# Patient Record
Sex: FEMALE | Race: WHITE | Employment: PART TIME | URBAN - NONMETROPOLITAN AREA
[De-identification: names, ages, dates, MRNs, and addresses within clinical notes are randomized per-mention and may not be internally consistent; named-entity substitution may affect disease eponyms.]

---

## 2017-02-23 ENCOUNTER — OFFICE VISIT (OUTPATIENT)
Dept: PSYCHIATRY | Age: 42
End: 2017-02-23

## 2017-02-23 DIAGNOSIS — F06.4 ANXIETY DISORDER DUE TO GENERAL MEDICAL CONDITION: ICD-10-CM

## 2017-02-23 DIAGNOSIS — F06.30 MOOD DISORDER DUE TO A GENERAL MEDICAL CONDITION: Primary | ICD-10-CM

## 2017-02-23 PROCEDURE — 99214 OFFICE O/P EST MOD 30 MIN: CPT | Performed by: NURSE PRACTITIONER

## 2017-02-23 PROCEDURE — G8428 CUR MEDS NOT DOCUMENT: HCPCS | Performed by: NURSE PRACTITIONER

## 2017-02-23 PROCEDURE — G8419 CALC BMI OUT NRM PARAM NOF/U: HCPCS | Performed by: NURSE PRACTITIONER

## 2017-02-23 PROCEDURE — G8484 FLU IMMUNIZE NO ADMIN: HCPCS | Performed by: NURSE PRACTITIONER

## 2017-02-23 PROCEDURE — 1036F TOBACCO NON-USER: CPT | Performed by: NURSE PRACTITIONER

## 2017-02-23 RX ORDER — RAMELTEON 8 MG/1
8 TABLET ORAL NIGHTLY PRN
Qty: 30 TABLET | Refills: 0 | Status: SHIPPED | OUTPATIENT
Start: 2017-02-23 | End: 2017-05-01 | Stop reason: ALTCHOICE

## 2017-02-23 RX ORDER — DULOXETIN HYDROCHLORIDE 30 MG/1
60 CAPSULE, DELAYED RELEASE ORAL 2 TIMES DAILY
COMMUNITY
End: 2022-01-31

## 2017-03-07 ENCOUNTER — TELEPHONE (OUTPATIENT)
Dept: PSYCHIATRY | Age: 42
End: 2017-03-07

## 2017-05-01 ENCOUNTER — OFFICE VISIT (OUTPATIENT)
Dept: ONCOLOGY | Age: 42
End: 2017-05-01

## 2017-05-01 VITALS
DIASTOLIC BLOOD PRESSURE: 80 MMHG | HEART RATE: 66 BPM | SYSTOLIC BLOOD PRESSURE: 132 MMHG | TEMPERATURE: 97.4 F | HEIGHT: 64 IN | BODY MASS INDEX: 28.48 KG/M2 | WEIGHT: 166.8 LBS | RESPIRATION RATE: 18 BRPM | OXYGEN SATURATION: 100 %

## 2017-05-01 DIAGNOSIS — Z85.3 HISTORY OF BREAST CANCER: Primary | ICD-10-CM

## 2017-05-01 PROCEDURE — 1036F TOBACCO NON-USER: CPT | Performed by: INTERNAL MEDICINE

## 2017-05-01 PROCEDURE — 99213 OFFICE O/P EST LOW 20 MIN: CPT | Performed by: INTERNAL MEDICINE

## 2017-05-01 PROCEDURE — G8419 CALC BMI OUT NRM PARAM NOF/U: HCPCS | Performed by: INTERNAL MEDICINE

## 2017-05-01 PROCEDURE — G8427 DOCREV CUR MEDS BY ELIG CLIN: HCPCS | Performed by: INTERNAL MEDICINE

## 2017-08-31 ENCOUNTER — HOSPITAL ENCOUNTER (OUTPATIENT)
Dept: MRI IMAGING | Age: 42
Discharge: HOME | End: 2017-08-31
Payer: COMMERCIAL

## 2017-08-31 DIAGNOSIS — G43.001 MIGRAINE WITHOUT AURA AND WITH STATUS MIGRAINOSUS, NOT INTRACTABLE: ICD-10-CM

## 2017-08-31 PROCEDURE — 70553 MRI BRAIN STEM W/O & W/DYE: CPT

## 2017-08-31 PROCEDURE — A9579 GAD-BASE MR CONTRAST NOS,1ML: HCPCS | Performed by: FAMILY MEDICINE

## 2017-08-31 PROCEDURE — 6360000004 HC RX CONTRAST MEDICATION: Performed by: FAMILY MEDICINE

## 2017-08-31 RX ADMIN — GADOTERIDOL 15 ML: 279.3 INJECTION, SOLUTION INTRAVENOUS at 09:01

## 2017-09-11 ENCOUNTER — OFFICE VISIT (OUTPATIENT)
Dept: ONCOLOGY | Age: 42
End: 2017-09-11
Payer: COMMERCIAL

## 2017-09-11 ENCOUNTER — HOSPITAL ENCOUNTER (OUTPATIENT)
Dept: INFUSION THERAPY | Age: 42
Discharge: HOME | End: 2017-09-11
Payer: COMMERCIAL

## 2017-09-11 VITALS
DIASTOLIC BLOOD PRESSURE: 71 MMHG | BODY MASS INDEX: 28.17 KG/M2 | OXYGEN SATURATION: 100 % | SYSTOLIC BLOOD PRESSURE: 98 MMHG | WEIGHT: 165 LBS | TEMPERATURE: 98.4 F | HEART RATE: 89 BPM | RESPIRATION RATE: 18 BRPM | HEIGHT: 64 IN

## 2017-09-11 DIAGNOSIS — Z85.3 HISTORY OF BREAST CANCER: Primary | ICD-10-CM

## 2017-09-11 DIAGNOSIS — Z85.3 HISTORY OF BREAST CANCER: ICD-10-CM

## 2017-09-11 LAB
ALBUMIN SERPL-MCNC: 4.7 G/DL (ref 3.5–5.1)
ALP BLD-CCNC: 80 U/L (ref 38–126)
ALT SERPL-CCNC: 13 U/L (ref 11–66)
AST SERPL-CCNC: 16 U/L (ref 5–40)
BASINOPHIL, AUTOMATED: 1 % (ref 0–12)
BILIRUB SERPL-MCNC: 0.2 MG/DL (ref 0.3–1.2)
BILIRUBIN DIRECT: < 0.2 MG/DL (ref 0–0.3)
BUN, WHOLE BLOOD: 21 MG/DL (ref 8–26)
CHLORIDE, WHOLE BLOOD: 106 MEQ/L (ref 98–109)
CREATININE, WHOLE BLOOD: 0.8 MG/DL (ref 0.5–1.2)
EOSINOPHILS RELATIVE PERCENT: 3 % (ref 0–12)
GFR, ESTIMATED ,CON: 83 ML/MIN/1.73M2
GLUCOSE, WHOLE BLOOD: 107 MG/DL (ref 70–108)
HCT VFR BLD CALC: 44.6 % (ref 37–47)
HEMOGLOBIN: 15.2 GM/DL (ref 12–16)
IONIZED CALCIUM, WHOLE BLOOD: 1.25 MMOL/L (ref 1.12–1.32)
LYMPHOCYTES # BLD: 35 % (ref 15–47)
MCH RBC QN AUTO: 29.7 PG (ref 27–31)
MCHC RBC AUTO-ENTMCNC: 34.2 GM/DL (ref 33–37)
MCV RBC AUTO: 87 FL (ref 81–99)
MONOCYTES: 7 % (ref 0–12)
PDW BLD-RTO: 12.7 % (ref 11.5–14.5)
PLATELET # BLD: 287 THOU/MM3 (ref 130–400)
PMV BLD AUTO: 8.1 MCM (ref 7.4–10.4)
POTASSIUM, WHOLE BLOOD: 3.9 MEQ/L (ref 3.5–4.9)
RBC # BLD: 5.13 MILL/MM3 (ref 4.2–5.4)
SEG NEUTROPHILS: 54 % (ref 43–75)
SODIUM, WHOLE BLOOD: 143 MEQ/L (ref 138–146)
TOTAL CO2, WHOLE BLOOD: 25 MEQ/L (ref 23–33)
TOTAL PROTEIN: 7.4 G/DL (ref 6.1–8)
WBC # BLD: 6.5 THOU/MM3 (ref 4.8–10.8)

## 2017-09-11 PROCEDURE — 86300 IMMUNOASSAY TUMOR CA 15-3: CPT

## 2017-09-11 PROCEDURE — G8428 CUR MEDS NOT DOCUMENT: HCPCS | Performed by: INTERNAL MEDICINE

## 2017-09-11 PROCEDURE — 80047 BASIC METABLC PNL IONIZED CA: CPT

## 2017-09-11 PROCEDURE — 80076 HEPATIC FUNCTION PANEL: CPT

## 2017-09-11 PROCEDURE — 99213 OFFICE O/P EST LOW 20 MIN: CPT | Performed by: INTERNAL MEDICINE

## 2017-09-11 PROCEDURE — 36415 COLL VENOUS BLD VENIPUNCTURE: CPT

## 2017-09-11 PROCEDURE — 99211 OFF/OP EST MAY X REQ PHY/QHP: CPT

## 2017-09-11 PROCEDURE — G8417 CALC BMI ABV UP PARAM F/U: HCPCS | Performed by: INTERNAL MEDICINE

## 2017-09-11 PROCEDURE — 1036F TOBACCO NON-USER: CPT | Performed by: INTERNAL MEDICINE

## 2017-09-11 PROCEDURE — 85025 COMPLETE CBC W/AUTO DIFF WBC: CPT

## 2017-09-11 RX ORDER — RIZATRIPTAN BENZOATE 10 MG/1
10 TABLET, ORALLY DISINTEGRATING ORAL
COMMUNITY
Start: 2017-09-08

## 2017-09-11 RX ORDER — TOPIRAMATE 50 MG/1
100 TABLET, FILM COATED ORAL 2 TIMES DAILY
COMMUNITY
Start: 2017-09-05

## 2017-09-13 LAB — CA 27-29: 20 U/ML (ref 0–38)

## 2017-09-19 DIAGNOSIS — Z85.3 HISTORY OF BREAST CANCER: Primary | ICD-10-CM

## 2017-10-24 ENCOUNTER — HOSPITAL ENCOUNTER (OUTPATIENT)
Dept: GENERAL RADIOLOGY | Age: 42
Discharge: HOME | End: 2017-10-24
Payer: COMMERCIAL

## 2017-10-24 ENCOUNTER — HOSPITAL ENCOUNTER (OUTPATIENT)
Age: 42
Discharge: HOME | End: 2017-10-24
Payer: COMMERCIAL

## 2017-10-24 DIAGNOSIS — M54.6 THORACIC SPINE PAIN: ICD-10-CM

## 2017-10-24 PROCEDURE — 72072 X-RAY EXAM THORAC SPINE 3VWS: CPT

## 2017-10-24 PROCEDURE — 72040 X-RAY EXAM NECK SPINE 2-3 VW: CPT

## 2017-12-21 ENCOUNTER — HOSPITAL ENCOUNTER (OUTPATIENT)
Dept: MRI IMAGING | Age: 42
Discharge: HOME | End: 2017-12-21
Payer: COMMERCIAL

## 2017-12-21 DIAGNOSIS — M54.2 NECK PAIN: ICD-10-CM

## 2017-12-21 DIAGNOSIS — Z85.3 HISTORY OF BREAST CANCER: ICD-10-CM

## 2017-12-21 PROCEDURE — C8908 MRI W/O FOL W/CONT, BREAST,: HCPCS

## 2017-12-21 PROCEDURE — A9579 GAD-BASE MR CONTRAST NOS,1ML: HCPCS | Performed by: INTERNAL MEDICINE

## 2017-12-21 PROCEDURE — 72141 MRI NECK SPINE W/O DYE: CPT

## 2017-12-21 PROCEDURE — 6360000004 HC RX CONTRAST MEDICATION: Performed by: INTERNAL MEDICINE

## 2017-12-21 RX ADMIN — GADOTERIDOL 15 ML: 279.3 INJECTION, SOLUTION INTRAVENOUS at 21:03

## 2018-02-12 ENCOUNTER — HOSPITAL ENCOUNTER (OUTPATIENT)
Dept: INFUSION THERAPY | Age: 43
Discharge: HOME | End: 2018-02-12
Payer: COMMERCIAL

## 2018-02-12 ENCOUNTER — OFFICE VISIT (OUTPATIENT)
Dept: ONCOLOGY | Age: 43
End: 2018-02-12
Payer: COMMERCIAL

## 2018-02-12 VITALS
RESPIRATION RATE: 16 BRPM | HEART RATE: 69 BPM | SYSTOLIC BLOOD PRESSURE: 104 MMHG | OXYGEN SATURATION: 100 % | BODY MASS INDEX: 26.63 KG/M2 | DIASTOLIC BLOOD PRESSURE: 73 MMHG | WEIGHT: 156 LBS | HEIGHT: 64 IN | TEMPERATURE: 98.2 F

## 2018-02-12 DIAGNOSIS — Z85.3 HISTORY OF BREAST CANCER: Primary | ICD-10-CM

## 2018-02-12 DIAGNOSIS — Z85.3 HISTORY OF BREAST CANCER: ICD-10-CM

## 2018-02-12 LAB
ALBUMIN SERPL-MCNC: 4.5 G/DL (ref 3.5–5.1)
ALP BLD-CCNC: 56 U/L (ref 38–126)
ALT SERPL-CCNC: 9 U/L (ref 11–66)
AST SERPL-CCNC: 11 U/L (ref 5–40)
BASINOPHIL, AUTOMATED: 0 % (ref 0–12)
BILIRUB SERPL-MCNC: 0.2 MG/DL (ref 0.3–1.2)
BILIRUBIN DIRECT: < 0.2 MG/DL (ref 0–0.3)
BUN, WHOLE BLOOD: 18 MG/DL (ref 8–26)
CHLORIDE, WHOLE BLOOD: 110 MEQ/L (ref 98–109)
CREATININE, WHOLE BLOOD: 1 MG/DL (ref 0.5–1.2)
EOSINOPHILS RELATIVE PERCENT: 1 % (ref 0–12)
GFR, ESTIMATED ,CON: 64 ML/MIN/1.73M2
GLUCOSE, WHOLE BLOOD: 86 MG/DL (ref 70–108)
HCT VFR BLD CALC: 40.2 % (ref 37–47)
HEMOGLOBIN: 13.7 GM/DL (ref 12–16)
IONIZED CALCIUM, WHOLE BLOOD: 1.26 MMOL/L (ref 1.12–1.32)
LYMPHOCYTES # BLD: 40 % (ref 15–47)
MCH RBC QN AUTO: 29.2 PG (ref 27–31)
MCHC RBC AUTO-ENTMCNC: 34 GM/DL (ref 33–37)
MCV RBC AUTO: 86 FL (ref 81–99)
MONOCYTES: 6 % (ref 0–12)
PDW BLD-RTO: 12.1 % (ref 11.5–14.5)
PLATELET # BLD: 267 THOU/MM3 (ref 130–400)
PMV BLD AUTO: 7.9 FL (ref 7.4–10.4)
POTASSIUM, WHOLE BLOOD: 4.1 MEQ/L (ref 3.5–4.9)
RBC # BLD: 4.68 MILL/MM3 (ref 4.2–5.4)
SEG NEUTROPHILS: 53 % (ref 43–75)
SODIUM, WHOLE BLOOD: 140 MEQ/L (ref 138–146)
TOTAL CO2, WHOLE BLOOD: 21 MEQ/L (ref 23–33)
TOTAL PROTEIN: 6.6 G/DL (ref 6.1–8)
WBC # BLD: 7.4 THOU/MM3 (ref 4.8–10.8)

## 2018-02-12 PROCEDURE — 99211 OFF/OP EST MAY X REQ PHY/QHP: CPT

## 2018-02-12 PROCEDURE — 86304 IMMUNOASSAY TUMOR CA 125: CPT

## 2018-02-12 PROCEDURE — 1036F TOBACCO NON-USER: CPT | Performed by: INTERNAL MEDICINE

## 2018-02-12 PROCEDURE — G8484 FLU IMMUNIZE NO ADMIN: HCPCS | Performed by: INTERNAL MEDICINE

## 2018-02-12 PROCEDURE — 85025 COMPLETE CBC W/AUTO DIFF WBC: CPT

## 2018-02-12 PROCEDURE — 80047 BASIC METABLC PNL IONIZED CA: CPT

## 2018-02-12 PROCEDURE — 80076 HEPATIC FUNCTION PANEL: CPT

## 2018-02-12 PROCEDURE — G8427 DOCREV CUR MEDS BY ELIG CLIN: HCPCS | Performed by: INTERNAL MEDICINE

## 2018-02-12 PROCEDURE — 99213 OFFICE O/P EST LOW 20 MIN: CPT | Performed by: INTERNAL MEDICINE

## 2018-02-12 PROCEDURE — G8417 CALC BMI ABV UP PARAM F/U: HCPCS | Performed by: INTERNAL MEDICINE

## 2018-02-12 PROCEDURE — 36415 COLL VENOUS BLD VENIPUNCTURE: CPT

## 2018-02-12 NOTE — PROGRESS NOTES
[facility] CANCER CENTER  CANCER NETWORK OF [facility]  ONCOLOGY SPECIALISTS OF [facility] [address] [facility] 98  [address], [facility] [address]  [address]  Dept: [phone]  Dept Fax: [phone]  Loc: [phone]    Subjective:      Chief Complaint: Blas Merino is a 38 y.o. female with breast cancer. She will has a past history of breast cancer dating back to 2009. In March 2009, the patient was noted to have a right breast cancer. She underwent bilateral mastectomy. The tumor in the right breast measured 1.5 cm and was noted to be an invasive carcinoma. One out of sixteen lymph nodes from the right axilla were positive for malignancy. There was no evidence of malignancy involving the left breast.  Her malignancy was noted to be estrogen receptor positive, progesterone receptor positive, and HER-2/mikala overexpression was not amplified. She received six cycles of TAC combination chemotherapy treatment. These were completed between April and November 2009. She was placed on antiestrogen therapy with tamoxifen and completed a full course of therapy. HPI:  The patient is here today for follow up evaluation. Her general health has been stable. In December 2017 the patient underwent an MRI of her chest.  She was noted to have bilateral mastectomies with bilateral implant reconstruction. There are several circumcised circumscribed enhancing micronodules measuring less than 5 mm within both breast.  A follow-up MRI was recommended to confirm stability in 6 months. No pathologically enlarged lymph nodes. The result was reviewed with the patient today in a follow-up MRI will be obtained in June 2018. The patient generally feels well without specific complaints. She has no signs or symptoms suggestive of recurrence of malignancy. The patient denies shortness of breath, chest pain, a change in bowel habits or a change in bladder habits.   ECOG performance status is

## 2018-02-13 LAB — CA 125: 6 U/ML

## 2018-06-11 ENCOUNTER — HOSPITAL ENCOUNTER (OUTPATIENT)
Dept: INFUSION THERAPY | Age: 43
Discharge: HOME | End: 2018-06-11
Payer: COMMERCIAL

## 2018-06-11 ENCOUNTER — OFFICE VISIT (OUTPATIENT)
Dept: ONCOLOGY | Age: 43
End: 2018-06-11
Payer: COMMERCIAL

## 2018-06-11 VITALS
DIASTOLIC BLOOD PRESSURE: 71 MMHG | SYSTOLIC BLOOD PRESSURE: 102 MMHG | TEMPERATURE: 98.2 F | WEIGHT: 146.4 LBS | BODY MASS INDEX: 25 KG/M2 | HEIGHT: 64 IN | OXYGEN SATURATION: 98 % | HEART RATE: 67 BPM | RESPIRATION RATE: 16 BRPM

## 2018-06-11 DIAGNOSIS — Z85.3 HISTORY OF BREAST CANCER: Primary | ICD-10-CM

## 2018-06-11 DIAGNOSIS — Z85.3 HISTORY OF BREAST CANCER: ICD-10-CM

## 2018-06-11 LAB
ALBUMIN SERPL-MCNC: 4.2 G/DL (ref 3.5–5.1)
ALP BLD-CCNC: 58 U/L (ref 38–126)
ALT SERPL-CCNC: 8 U/L (ref 11–66)
AST SERPL-CCNC: 12 U/L (ref 5–40)
BASINOPHIL, AUTOMATED: 0 % (ref 0–3)
BILIRUB SERPL-MCNC: 0.3 MG/DL (ref 0.3–1.2)
BILIRUBIN DIRECT: < 0.2 MG/DL (ref 0–0.3)
BUN, WHOLE BLOOD: 17 MG/DL (ref 8–26)
CHLORIDE, WHOLE BLOOD: 106 MEQ/L (ref 98–109)
CREATININE, WHOLE BLOOD: 1 MG/DL (ref 0.5–1.2)
EOSINOPHILS RELATIVE PERCENT: 2 % (ref 0–4)
GFR, ESTIMATED ,CON: 64 ML/MIN/1.73M2
GLUCOSE, WHOLE BLOOD: 104 MG/DL (ref 70–108)
HCT VFR BLD CALC: 38.5 % (ref 37–47)
HEMOGLOBIN: 13.1 GM/DL (ref 12–16)
IONIZED CALCIUM, WHOLE BLOOD: 1.2 MMOL/L (ref 1.12–1.32)
LYMPHOCYTES # BLD: 42 % (ref 15–47)
MCH RBC QN AUTO: 29.6 PG (ref 27–31)
MCHC RBC AUTO-ENTMCNC: 33.9 GM/DL (ref 33–37)
MCV RBC AUTO: 87 FL (ref 81–99)
MONOCYTES: 5 % (ref 0–12)
PDW BLD-RTO: 11.7 % (ref 11.5–14.5)
PLATELET # BLD: 288 THOU/MM3 (ref 130–400)
PMV BLD AUTO: 7.7 FL (ref 7.4–10.4)
POTASSIUM, WHOLE BLOOD: 3.5 MEQ/L (ref 3.5–4.9)
RBC # BLD: 4.41 MILL/MM3 (ref 4.2–5.4)
SEG NEUTROPHILS: 51 % (ref 43–75)
SODIUM, WHOLE BLOOD: 139 MEQ/L (ref 138–146)
TOTAL CO2, WHOLE BLOOD: 20 MEQ/L (ref 23–33)
TOTAL PROTEIN: 6.7 G/DL (ref 6.1–8)
WBC # BLD: 7.6 THOU/MM3 (ref 4.8–10.8)

## 2018-06-11 PROCEDURE — 85025 COMPLETE CBC W/AUTO DIFF WBC: CPT

## 2018-06-11 PROCEDURE — 86300 IMMUNOASSAY TUMOR CA 15-3: CPT

## 2018-06-11 PROCEDURE — 36415 COLL VENOUS BLD VENIPUNCTURE: CPT

## 2018-06-11 PROCEDURE — G8420 CALC BMI NORM PARAMETERS: HCPCS | Performed by: INTERNAL MEDICINE

## 2018-06-11 PROCEDURE — G8428 CUR MEDS NOT DOCUMENT: HCPCS | Performed by: INTERNAL MEDICINE

## 2018-06-11 PROCEDURE — 80076 HEPATIC FUNCTION PANEL: CPT

## 2018-06-11 PROCEDURE — 80047 BASIC METABLC PNL IONIZED CA: CPT

## 2018-06-11 PROCEDURE — 99213 OFFICE O/P EST LOW 20 MIN: CPT | Performed by: INTERNAL MEDICINE

## 2018-06-11 PROCEDURE — 99211 OFF/OP EST MAY X REQ PHY/QHP: CPT

## 2018-06-11 PROCEDURE — 1036F TOBACCO NON-USER: CPT | Performed by: INTERNAL MEDICINE

## 2018-06-14 LAB — CA 27-29: < 4 U/ML (ref 0–38)

## 2018-10-26 ENCOUNTER — HOSPITAL ENCOUNTER (OUTPATIENT)
Dept: MRI IMAGING | Age: 43
Discharge: HOME | End: 2018-10-26
Payer: COMMERCIAL

## 2018-10-26 DIAGNOSIS — Z85.3 HISTORY OF BREAST CANCER: ICD-10-CM

## 2018-10-26 PROCEDURE — A9579 GAD-BASE MR CONTRAST NOS,1ML: HCPCS | Performed by: INTERNAL MEDICINE

## 2018-10-26 PROCEDURE — 0159T MRI BREAST BILATERAL W WO CONTRAST: CPT

## 2018-10-26 PROCEDURE — 6360000004 HC RX CONTRAST MEDICATION: Performed by: INTERNAL MEDICINE

## 2018-10-26 RX ADMIN — GADOTERIDOL 15 ML: 279.3 INJECTION, SOLUTION INTRAVENOUS at 18:17

## 2019-04-04 ENCOUNTER — OFFICE VISIT (OUTPATIENT)
Dept: ENT CLINIC | Age: 44
End: 2019-04-04
Payer: COMMERCIAL

## 2019-04-04 VITALS
TEMPERATURE: 97.8 F | DIASTOLIC BLOOD PRESSURE: 78 MMHG | RESPIRATION RATE: 12 BRPM | HEIGHT: 64 IN | WEIGHT: 135.3 LBS | HEART RATE: 84 BPM | SYSTOLIC BLOOD PRESSURE: 112 MMHG | BODY MASS INDEX: 23.1 KG/M2

## 2019-04-04 DIAGNOSIS — J31.0 CHRONIC RHINITIS: Primary | ICD-10-CM

## 2019-04-04 DIAGNOSIS — Z86.39 HISTORY OF HYPOTHYROIDISM: ICD-10-CM

## 2019-04-04 PROCEDURE — G8427 DOCREV CUR MEDS BY ELIG CLIN: HCPCS | Performed by: OTOLARYNGOLOGY

## 2019-04-04 PROCEDURE — G8420 CALC BMI NORM PARAMETERS: HCPCS | Performed by: OTOLARYNGOLOGY

## 2019-04-04 PROCEDURE — 99243 OFF/OP CNSLTJ NEW/EST LOW 30: CPT | Performed by: OTOLARYNGOLOGY

## 2019-04-04 RX ORDER — OLOPATADINE HYDROCHLORIDE 665 UG/1
2 SPRAY NASAL 2 TIMES DAILY
Qty: 1 BOTTLE | Refills: 1 | Status: SHIPPED | OUTPATIENT
Start: 2019-04-04 | End: 2019-04-05 | Stop reason: CLARIF

## 2019-04-04 RX ORDER — LORATADINE 10 MG/1
10 TABLET ORAL DAILY
Qty: 30 TABLET | Refills: 3 | Status: SHIPPED | OUTPATIENT
Start: 2019-04-04 | End: 2020-04-03

## 2019-04-04 ASSESSMENT — ENCOUNTER SYMPTOMS
SHORTNESS OF BREATH: 0
CHEST TIGHTNESS: 0
VOMITING: 0
CHOKING: 0
SINUS PRESSURE: 0
STRIDOR: 0
COUGH: 0
RHINORRHEA: 0
NAUSEA: 0
ABDOMINAL PAIN: 0
DIARRHEA: 0
APNEA: 0
FACIAL SWELLING: 0
TROUBLE SWALLOWING: 0
VOICE CHANGE: 0
COLOR CHANGE: 0
WHEEZING: 0
SORE THROAT: 0

## 2019-04-04 NOTE — LETTER
[address] [facility] and [address] [address] [place] [address] [address]  [address]  Phone: [phone]  Fax: [phone]    Jyoti Barbosa MD        May 7, 2019    Marcella Hernandez, [address]    Patient: Yury Barcenas   MR Number: [id number]   YOB: 1975   Date of Visit: 4/4/2019     Dear Marcella Hernandez,    Thank you for the request for consultation for Yury Barcenas to me for the evaluation of chronic rhinorrhea. She had a very thorough evaluation. I am starting out by treating her for allergies, and checking on her thyroid status. Below are the relevant portions of my assessment and plan of care. Assessment & Plan   Diagnoses and all orders for this visit:     Diagnosis Orders   1. Chronic rhinitis  olopatadine (PATANASE) 0.6 % SOLN nassl soln    Nasal Culture    loratadine (CLARITIN) 10 MG tablet   2. History of hypothyroidism  T3, Free    T4, Free    TSH without Reflex    Thyroid Peroxidase Antibody    Anti-Thyroglobulin Antibody       The findings were explained and her questions were answered. Options were discussed including ordering Patanase, I took a nasal culture. She will need to try it for at least 2 weeks. She will be placed on Claritin as well. I will see her back in 1 month      If you have questions, please do not hesitate to call me. I look forward to following Raphael Small along with you.     Sincerely,          Jyoti Barbosa MD

## 2019-04-04 NOTE — PROGRESS NOTES
[address], NOSE AND THROAT  [place] [address]  [address]  [address] [address]  Dept: [phone]  Dept Fax: [phone]  Loc: [phone]    Nahomi Storm is a 37 y.o. female who was referred Treva Patino X for:  Chief Complaint   Patient presents with    Sinus Problem     New patient here for chronic rhinorrhea, taste loss, referred by Dr. Marlyn Alanis   . HPI:     Nahomi Storm is a 37 y.o. female who presents today for chronic rhinorrhea, taste loss. She has had a chronic runny nose for 20  Years. She tried nasonex, zyrtec, and also tried flonase. None of thoes helped dry it up. She can't go more than 5 minutes without rubbing her nose. She has sores in her nose at times. She thinks from rubbing it. She uses a chap stick from bath and body to put on her nose to help with the redness. She has not used Afrin or any decongestion sprays. She has not used Astiline, Patanase or Atrovent. She sometimes uses Vicks on her nose at night. She only uses it when it's really sore. It was several months ago when she last used Vicks on her nose. She does not use cough drops, no menthol. She does not use mouth wash either. Her nose drips on both sides. She noticed when she uses the sprays they drip right out of her nose. She has not had surgery on her nose. She has had a runny nose post cancer but it has gotten worse. It does not crust up. The drainage is clear and watery and wet not mucus. She does not have any earaches. Her jaw has been sore lately. She is aware of TMJ. She has 5 dogs, 5 hoarses and she does not have watery itchy eyes. [place]. She has not had any allergies to animals. She is around animals all the time. She has had antibiotics since she had an allergic reaction to Cefuroxime. She was pregnant with her daughter and had a rash acorss her chest.      Her nose is never plugged.   It tongue: symmetric,  Larynx, mirror exam: unable due to gag reflex     Eyes: Right eye exhibits normal extraocular motion and no nystagmus. Left eye exhibits normal extraocular motion and no nystagmus. Conjugate gaze   Neck: Trachea normal and phonation normal. Neck supple. No tracheal deviation present. No thyroid mass and no thyromegaly present. No adenopathy. Salivary glands not enlarged and normal to palpation     Pulmonary/Chest: Effort normal. No stridor. Neurological: She is alert and oriented to person, place, and time. No cranial nerve deficit (VIIth N function intact bilat). Psychiatric: She has a normal mood and affect. Nursing note and vitals reviewed. Patch Test:  Left Arm:Chap Stick  Right Arm: Babe Lash syrum    Data:  All of the past medical history, past surgical history, family history,social history, allergies and current medications were reviewed with the patient. Assessment & Plan   Diagnoses and all orders for this visit:     Diagnosis Orders   1. Chronic rhinitis  olopatadine (PATANASE) 0.6 % SOLN nassl soln    Nasal Culture    loratadine (CLARITIN) 10 MG tablet   2. History of hypothyroidism  T3, Free    T4, Free    TSH without Reflex    Thyroid Peroxidase Antibody    Anti-Thyroglobulin Antibody       The findings were explained and her questions were answered. Options were discussed including ordering Patanase, I took a nasal culture. She will need to try it for at least 2 weeks. She will be placed on Claritin as well. I will see her back in 1 month       Izabella MCINTYRE CMA ([facility]), am scribing for, and in the presence of Dr. Elaine Caceres. Electronically signed by Carmelita Mena CMA ([facility]) on 4/4/19 at 3:21 PM.     (Please note that portions of this note were completed with a voice recognition program. Efforts were made to edit the dictations butoccasionally words are mis-transcribed.)    I agree to the above documentation placed by my scribe.   I have personally evaluated this patient. Additional findings are as noted. I reviewed the scribe's note and agree with the documented findings and plan of care. Any areas of disagreement are corrected. I agree with the chief complaint, past medical history, past surgical history, allergies, medications, social and family history as documented unless otherwise noted below.      Electronically signed by Samir Cotton MD on 5/7/2019 at 12:26 AM

## 2019-04-05 ENCOUNTER — TELEPHONE (OUTPATIENT)
Dept: ENT CLINIC | Age: 44
End: 2019-04-05

## 2019-04-05 RX ORDER — AZELASTINE 1 MG/ML
2 SPRAY, METERED NASAL 2 TIMES DAILY
Qty: 4 BOTTLE | Refills: 1 | Status: SHIPPED | OUTPATIENT
Start: 2019-04-05 | End: 2020-12-15

## 2019-04-05 NOTE — TELEPHONE ENCOUNTER
Patient called in stating she was seen yesterday by Dr Albertina Mcdonnell. He had asked her if she could possibly have a CSF leak. Patient said no, of course not, she has not had any head injury. After she got home she looked up the symptoms of CSF leaks. She states she has several of the symptoms that were listed, so now she is thinking it could be a CSF leak. She also stated the nasal spray he prescribed was $126.00. If it is a CSF leak will the nasal spray help? Do you recommend her getting the spray to use until her f/u appointment?

## 2019-04-08 LAB — AEROBIC CULTURE: NORMAL

## 2019-04-22 ENCOUNTER — OFFICE VISIT (OUTPATIENT)
Dept: ONCOLOGY | Age: 44
End: 2019-04-22
Payer: COMMERCIAL

## 2019-04-22 ENCOUNTER — HOSPITAL ENCOUNTER (OUTPATIENT)
Dept: INFUSION THERAPY | Age: 44
Discharge: HOME | End: 2019-04-22
Payer: COMMERCIAL

## 2019-04-22 VITALS
WEIGHT: 136 LBS | BODY MASS INDEX: 23.22 KG/M2 | DIASTOLIC BLOOD PRESSURE: 78 MMHG | HEIGHT: 64 IN | SYSTOLIC BLOOD PRESSURE: 108 MMHG | RESPIRATION RATE: 16 BRPM | HEART RATE: 114 BPM | TEMPERATURE: 98.7 F

## 2019-04-22 DIAGNOSIS — Z85.3 HISTORY OF BREAST CANCER: Primary | ICD-10-CM

## 2019-04-22 DIAGNOSIS — Z85.3 HISTORY OF BREAST CANCER: ICD-10-CM

## 2019-04-22 LAB
ALBUMIN SERPL-MCNC: 4.1 G/DL (ref 3.5–5.1)
ALP BLD-CCNC: 60 U/L (ref 38–126)
ALT SERPL-CCNC: 23 U/L (ref 11–66)
AST SERPL-CCNC: 21 U/L (ref 5–40)
BASOPHILS # BLD: 0.8 %
BASOPHILS ABSOLUTE: 0.1 THOU/MM3 (ref 0–0.1)
BILIRUB SERPL-MCNC: < 0.2 MG/DL (ref 0.3–1.2)
BILIRUBIN DIRECT: < 0.2 MG/DL (ref 0–0.3)
BUN, WHOLE BLOOD: 12 MG/DL (ref 8–26)
CHLORIDE, WHOLE BLOOD: 110 MEQ/L (ref 98–109)
CREATININE, WHOLE BLOOD: 0.8 MG/DL (ref 0.5–1.2)
EOSINOPHIL # BLD: 1.7 %
EOSINOPHILS ABSOLUTE: 0.2 THOU/MM3 (ref 0–0.4)
GFR, ESTIMATED ,CON: 82 ML/MIN/1.73M2
GLUCOSE, WHOLE BLOOD: 148 MG/DL (ref 70–108)
HCT VFR BLD CALC: 42.4 % (ref 37–47)
HEMOGLOBIN: 14.1 GM/DL (ref 12–16)
IMMATURE GRANS (ABS): 0.03 THOU/MM3 (ref 0–0.07)
IMMATURE GRANULOCYTES: 0.3 %
IONIZED CALCIUM, WHOLE BLOOD: 1.27 MMOL/L (ref 1.12–1.32)
LYMPHOCYTES # BLD: 30.7 %
LYMPHOCYTES ABSOLUTE: 3.2 THOU/MM3 (ref 1–4.8)
MCH RBC QN AUTO: 29.2 PG (ref 27–31)
MCHC RBC AUTO-ENTMCNC: 33.3 GM/DL (ref 33–37)
MCV RBC AUTO: 88 FL (ref 81–99)
MONOCYTES # BLD: 4.1 %
MONOCYTES ABSOLUTE: 0.4 THOU/MM3 (ref 0.4–1.3)
NUCLEATED RED BLOOD CELLS: 0 /100 WBC
PDW BLD-RTO: 12 % (ref 11.5–14.5)
PLATELET # BLD: 303 THOU/MM3 (ref 130–400)
PMV BLD AUTO: 7.9 FL (ref 7.4–10.4)
POTASSIUM, WHOLE BLOOD: 4.6 MEQ/L (ref 3.5–4.9)
RBC # BLD: 4.83 MILL/MM3 (ref 4.2–5.4)
SEG NEUTROPHILS: 62.4 %
SEGMENTED NEUTROPHILS ABSOLUTE COUNT: 6.4 THOU/MM3 (ref 1.8–7.7)
SODIUM, WHOLE BLOOD: 142 MEQ/L (ref 138–146)
TOTAL CO2, WHOLE BLOOD: 21 MEQ/L (ref 23–33)
TOTAL PROTEIN: 6.5 G/DL (ref 6.1–8)
WBC # BLD: 10.3 THOU/MM3 (ref 4.8–10.8)

## 2019-04-22 PROCEDURE — 85025 COMPLETE CBC W/AUTO DIFF WBC: CPT

## 2019-04-22 PROCEDURE — G8420 CALC BMI NORM PARAMETERS: HCPCS | Performed by: INTERNAL MEDICINE

## 2019-04-22 PROCEDURE — 99213 OFFICE O/P EST LOW 20 MIN: CPT | Performed by: INTERNAL MEDICINE

## 2019-04-22 PROCEDURE — 80076 HEPATIC FUNCTION PANEL: CPT

## 2019-04-22 PROCEDURE — 1036F TOBACCO NON-USER: CPT | Performed by: INTERNAL MEDICINE

## 2019-04-22 PROCEDURE — 80047 BASIC METABLC PNL IONIZED CA: CPT

## 2019-04-22 PROCEDURE — G8427 DOCREV CUR MEDS BY ELIG CLIN: HCPCS | Performed by: INTERNAL MEDICINE

## 2019-04-22 PROCEDURE — 36415 COLL VENOUS BLD VENIPUNCTURE: CPT

## 2019-04-22 PROCEDURE — 99211 OFF/OP EST MAY X REQ PHY/QHP: CPT

## 2019-04-22 NOTE — PROGRESS NOTES
[facility] CANCER CENTER  CANCER NETWORK OF [facility]  [facility] [address] [facility]  [address], [facility] [address]  [address]  Dept: [phone]  Dept Fax: [phone]  Loc: [phone]    Subjective:      Chief Complaint: Laura Leal is a 37 y.o. female with breast cancer. She will has a past history of breast cancer dating back to 2009. In March 2009, the patient was noted to have a right breast cancer. She underwent bilateral mastectomy. The tumor in the right breast measured 1.5 cm and was noted to be an invasive carcinoma. One out of sixteen lymph nodes from the right axilla were positive for malignancy. There was no evidence of malignancy involving the left breast.  Her malignancy was noted to be estrogen receptor positive, progesterone receptor positive, and HER-2/mikala overexpression was not amplified. She received six cycles of TAC combination chemotherapy treatment. These were completed between April and November 2009. She was placed on antiestrogen therapy with tamoxifen and completed a full course of therapy. HPI:  The patient is here today for follow examination. She is here for follow up of her history of breast cancer. Her overall health has been good. She has no signs or symptoms that are suggestive of recurrence of her breast cancer. The patient denies skeletal pain. She has not had fever or other signs of infection. The patient denies shortness of breath, chest pain, a change in bowel habits or a change in bladder habits. ECOG performance status is level 0. The patient has had bilateral mastectomies and has no evidence of any localized chest wall recurrence. She has had breast reconstruction. PMH, SH, and FH:  I reviewed the PMH, SH and FH as noted on the electronic medical record. There have been no changes as noted in the previous documentation. Review of Systems  Constitutional: Negative.     HENT: the [facility]

## 2019-04-30 ENCOUNTER — TELEPHONE (OUTPATIENT)
Dept: ENT CLINIC | Age: 44
End: 2019-04-30

## 2019-04-30 DIAGNOSIS — Z86.39 HISTORY OF HYPOTHYROIDISM: Primary | ICD-10-CM

## 2019-04-30 DIAGNOSIS — J31.0 CHRONIC RHINITIS: ICD-10-CM

## 2019-04-30 RX ORDER — LEVOTHYROXINE SODIUM 0.05 MG/1
50 TABLET ORAL DAILY
Qty: 30 TABLET | Refills: 1 | Status: SHIPPED | OUTPATIENT
Start: 2019-04-30 | End: 2019-06-29 | Stop reason: SDUPTHER

## 2019-04-30 NOTE — TELEPHONE ENCOUNTER
Please call the patient and let her know that her thyroid labs are a little off. Dr. Kathryn Gary recommended we start her on thyroid replacement. We have a few options once she starts the medication. 1. She can either come in on May 10th for her regular appointment and her and Dr. Kathryn Gary discuss how she feels and possibly alter the medication at that time. She should notice some differences from the medication by that point.     2. She can take the medication for 6 weeks and then get the bloodwork redrawn and we can see what the numbers look like at that time

## 2019-05-03 RX ORDER — IPRATROPIUM BROMIDE 42 UG/1
2 SPRAY, METERED NASAL 4 TIMES DAILY PRN
Qty: 1 BOTTLE | Refills: 3 | Status: SHIPPED | OUTPATIENT
Start: 2019-05-03 | End: 2019-08-24 | Stop reason: SDUPTHER

## 2019-05-03 NOTE — TELEPHONE ENCOUNTER
Called patient, informed her that her thyroid labs are a little off. Dr. Michelle Padilla recommended we start her on thyroid replacement. We have a few options once she starts the medication.       1. She can either come in on May 10th for her regular appointment and her and Dr. Michelle Padilla discuss how she feels and possibly alter the medication at that time. She should notice some differences from the medication by that point.     2. She can take the medication for 6 weeks and then get the bloodwork redrawn and we can see what the numbers look like at that time    Patient stated this is not the main complaint she was seeing him for, her rhinitis was. Patient stated the nasal spray Dr Michelle Padilla prescribed did not help at all. She did the spray and took Claritin as instructed. She is asking if there is another nasal spray she could try. She is willing to wait the 6 weeks for an appointment if he wants to prescribe another spray for her to use for those 6 weeks and come in to discuss both her nose issues and the thyroid labs. Patient also asked for us to leave a detailed message if she does not answer.

## 2019-05-03 NOTE — TELEPHONE ENCOUNTER
Called patient and left a detailed message, informed patient that we sent in a different nasal spray. Stated that we would like her to started the thyroid medication then in 6 weeks do thyroid lab test and we will see her in about 7 weeks. Informed patient to call the office and we get her a return appointment.      We will need to cancel the appointment for 5/10/19

## 2019-06-12 ENCOUNTER — TELEPHONE (OUTPATIENT)
Dept: ENT CLINIC | Age: 44
End: 2019-06-12

## 2019-06-12 NOTE — TELEPHONE ENCOUNTER
She should not take Cytomel with this medication, could discuss this possibility with Dr. Shonda Todd at next visit. I will discuss with Dr. Shonda Todd tomorrow. Does she have any heart palpitations (heart racing or pounding hard), shortness of breath, or chest pain? Does she feel jittery?

## 2019-06-12 NOTE — TELEPHONE ENCOUNTER
Patient called in stating Dr Michelle Padilla placed her on Synthroid at the end of April. She has been taking it and states she is hungry all the time and has gained 5 pounds. She would like to know if this is a side effect of the Synthroid. Please advise. She also states years ago when she took Synthroid she also took Cytomel with it. Is this something she should also be taking? Please advise.

## 2019-06-13 NOTE — TELEPHONE ENCOUNTER
Spoke with Emmy Mccormack and he stated that he discuss with Dr. Kathryn Gary and he would like patient to stay on dose and have labs drawn at the 6 weeks. Also inform patient to stop the Cytomel. Attempted to call patient. Got voicemail, left message to call the office.

## 2019-06-13 NOTE — TELEPHONE ENCOUNTER
Spoke to patient. She is not having heart palpitations, SOB, or chest pain. She does not feel jittery. The only complaint she has is constantly feeling hungry and gaining the 5 pounds. Patient informed we will discuss with Dr Juan Palbo Peraza and give her a call back. Patient verbalized understanding and thanked me.

## 2019-06-14 NOTE — TELEPHONE ENCOUNTER
Called patient and informed. She stated that she will stop in the office to  the lab orders. She stated she has taken the cytomel for a long time.

## 2019-06-21 NOTE — TELEPHONE ENCOUNTER
Spoke with Dr. Paulino Ramon ask patient has a follow up appointment on 6/25/19 he stated that if patient gets the labs done 6/21/19 or 6/22/19 then she can keep her appointment on 6/25/19. He stated he would like patient to have the lab work done prior to having an appointment.      Called patient and left a message, informed patient that we would like her to get her lab work completed 6/21/19 or 6/22/19 so we have those back in time for her appointment on 6/25/19

## 2019-06-29 DIAGNOSIS — Z86.39 HISTORY OF HYPOTHYROIDISM: ICD-10-CM

## 2019-07-01 RX ORDER — LEVOTHYROXINE SODIUM 50 MCG
TABLET ORAL
Qty: 30 TABLET | Refills: 0 | Status: SHIPPED | OUTPATIENT
Start: 2019-07-01 | End: 2019-09-06 | Stop reason: DRUGHIGH

## 2019-07-18 ENCOUNTER — TELEPHONE (OUTPATIENT)
Dept: ENT CLINIC | Age: 44
End: 2019-07-18

## 2019-07-18 DIAGNOSIS — Z86.39 HISTORY OF HYPOTHYROIDISM: Primary | ICD-10-CM

## 2019-07-18 NOTE — TELEPHONE ENCOUNTER
Patient left message on clinical voicemail stating she got her thyroid labs done today and she would like for Dr Baldev Arguelles to look at them. She stated she is loosing handfuls of hair at a time. Her next appointment is 08/16/19. She is wanting to know if she can get in sooner or if Dr Baldev Arguelles wants to change her medication. We do not have results of the labs. They are not even showing in process. I attempted to call patient back, got her voicemail, left message asking her to call us back to let us know where she got the labs done at so we can get the results from them.

## 2019-07-18 NOTE — LETTER
[address] [facility], NOSE AND THROAT  [address] [place] [address]  [address] [address]  Dept: [phone]  Dept Fax: [phone]  Loc: [phone]   Toll Free [phone]    PETER Norwoodarnold  [address] [facility] [address]      7/30/2019    Dear Ms. Paz: We were unable to reach you by phone. Please call our office at your earliest convenience. This message is regarding:  medication and labs     Please call between the hours of 8:00am and 4:30pm Monday through Friday if possible. Thank you.      Era Yan

## 2019-07-25 RX ORDER — LEVOTHYROXINE SODIUM 0.1 MG/1
100 TABLET ORAL DAILY
Qty: 30 TABLET | Refills: 1 | Status: SHIPPED | OUTPATIENT
Start: 2019-07-25 | End: 2019-09-06 | Stop reason: DRUGHIGH

## 2019-07-30 NOTE — TELEPHONE ENCOUNTER
Attempted to call patient. Got voicemail, left message to call the office. This has been the 3rd attempt to contact patient. Will mail patient a letter.

## 2019-08-16 ENCOUNTER — TELEPHONE (OUTPATIENT)
Dept: ENT CLINIC | Age: 44
End: 2019-08-16

## 2019-08-16 NOTE — TELEPHONE ENCOUNTER
Patient called back and stated that she has not had her blood work completed, she not to have it completed till 9/6/19 as we changed her medication for 50 mcg to 100 mcg. Patient was rescheduled to 9/13/19 to go over the results. She stated that she would like to also discuss with Dr. Sean Burkett at her appointment about the post nasal drip. She stated that she originally came for a runny nose and then she was being seen also for her thyroid. She stated that we recently changed her nasal spray to ipratropium and it has seemed to help with the runny nose however has gotten rid of her total problem as she still has post nasal drip. She also wanted to know if there is a side of effect of decreased smell. She stated that since starting the nasal spray she has noticed her sense of smell has decreased. Please advise. Patient stated we can leave a detailed message on her voicemail.

## 2019-08-24 DIAGNOSIS — J31.0 CHRONIC RHINITIS: ICD-10-CM

## 2019-08-26 RX ORDER — IPRATROPIUM BROMIDE 42 UG/1
SPRAY, METERED NASAL
Qty: 1 BOTTLE | Refills: 2 | Status: SHIPPED | OUTPATIENT
Start: 2019-08-26 | End: 2019-11-05 | Stop reason: SDUPTHER

## 2019-08-27 ENCOUNTER — OFFICE VISIT (OUTPATIENT)
Dept: ONCOLOGY | Age: 44
End: 2019-08-27
Payer: COMMERCIAL

## 2019-08-27 ENCOUNTER — HOSPITAL ENCOUNTER (OUTPATIENT)
Dept: INFUSION THERAPY | Age: 44
Discharge: HOME | End: 2019-08-27
Payer: COMMERCIAL

## 2019-08-27 VITALS
SYSTOLIC BLOOD PRESSURE: 114 MMHG | HEART RATE: 102 BPM | WEIGHT: 134.6 LBS | OXYGEN SATURATION: 100 % | DIASTOLIC BLOOD PRESSURE: 80 MMHG | RESPIRATION RATE: 18 BRPM | BODY MASS INDEX: 22.98 KG/M2 | HEIGHT: 64 IN | TEMPERATURE: 98.9 F

## 2019-08-27 DIAGNOSIS — Z85.3 HISTORY OF BREAST CANCER: Primary | ICD-10-CM

## 2019-08-27 DIAGNOSIS — Z85.3 HISTORY OF BREAST CANCER: ICD-10-CM

## 2019-08-27 DIAGNOSIS — Z86.39 HISTORY OF HYPOTHYROIDISM: ICD-10-CM

## 2019-08-27 LAB
ALBUMIN SERPL-MCNC: 4.8 G/DL (ref 3.5–5.1)
ALP BLD-CCNC: 48 U/L (ref 38–126)
ALT SERPL-CCNC: 12 U/L (ref 11–66)
AST SERPL-CCNC: 15 U/L (ref 5–40)
BILIRUB SERPL-MCNC: 0.3 MG/DL (ref 0.3–1.2)
BILIRUBIN DIRECT: < 0.2 MG/DL (ref 0–0.3)
BUN, WHOLE BLOOD: 13 MG/DL (ref 8–26)
CHLORIDE, WHOLE BLOOD: 105 MEQ/L (ref 98–109)
CREATININE, WHOLE BLOOD: 1 MG/DL (ref 0.5–1.2)
GFR, ESTIMATED ,CON: 63 ML/MIN/1.73M2
GLUCOSE, WHOLE BLOOD: 129 MG/DL (ref 70–108)
HCT VFR BLD CALC: 42.7 % (ref 37–47)
HEMOGLOBIN: 14.4 GM/DL (ref 12–16)
IONIZED CALCIUM, WHOLE BLOOD: 1.25 MMOL/L (ref 1.12–1.32)
MCH RBC QN AUTO: 29.5 PG (ref 27–31)
MCHC RBC AUTO-ENTMCNC: 33.6 GM/DL (ref 33–37)
MCV RBC AUTO: 88 FL (ref 81–99)
PDW BLD-RTO: 12.4 % (ref 11.5–14.5)
PLATELET # BLD: 371 THOU/MM3 (ref 130–400)
PMV BLD AUTO: 7.7 FL (ref 7.4–10.4)
POTASSIUM, WHOLE BLOOD: 4.1 MEQ/L (ref 3.5–4.9)
RBC # BLD: 4.87 MILL/MM3 (ref 4.2–5.4)
SEG NEUTROPHILS: 54 % (ref 43–75)
SEGMENTED NEUTROPHILS ABSOLUTE COUNT: 4.3 THOU/MM3 (ref 1.8–7.7)
SODIUM, WHOLE BLOOD: 139 MEQ/L (ref 138–146)
T4 FREE: 1.11 NG/DL (ref 0.93–1.76)
TOTAL CO2, WHOLE BLOOD: 23 MEQ/L (ref 23–33)
TOTAL PROTEIN: 7 G/DL (ref 6.1–8)
TSH SERPL DL<=0.05 MIU/L-ACNC: 7.2 UIU/ML (ref 0.4–4.2)
WBC # BLD: 8 THOU/MM3 (ref 4.8–10.8)

## 2019-08-27 PROCEDURE — 36415 COLL VENOUS BLD VENIPUNCTURE: CPT

## 2019-08-27 PROCEDURE — 80076 HEPATIC FUNCTION PANEL: CPT

## 2019-08-27 PROCEDURE — 84443 ASSAY THYROID STIM HORMONE: CPT

## 2019-08-27 PROCEDURE — G8427 DOCREV CUR MEDS BY ELIG CLIN: HCPCS | Performed by: INTERNAL MEDICINE

## 2019-08-27 PROCEDURE — 85027 COMPLETE CBC AUTOMATED: CPT

## 2019-08-27 PROCEDURE — 99211 OFF/OP EST MAY X REQ PHY/QHP: CPT

## 2019-08-27 PROCEDURE — 99213 OFFICE O/P EST LOW 20 MIN: CPT | Performed by: INTERNAL MEDICINE

## 2019-08-27 PROCEDURE — 1036F TOBACCO NON-USER: CPT | Performed by: INTERNAL MEDICINE

## 2019-08-27 PROCEDURE — G8420 CALC BMI NORM PARAMETERS: HCPCS | Performed by: INTERNAL MEDICINE

## 2019-08-27 PROCEDURE — 80047 BASIC METABLC PNL IONIZED CA: CPT

## 2019-08-27 PROCEDURE — 84439 ASSAY OF FREE THYROXINE: CPT

## 2019-08-27 NOTE — PROGRESS NOTES
Musculoskeletal: Negative. Skin: Negative. Neurological: Negative. Hematological: Negative. Psychiatric/Behavioral: Negative. Objective:   Physical Exam   Vitals:    08/27/19 1344   BP: 114/80   Pulse: 102   Resp: 18   Temp: 98.9 °F (37.2 °C)   SpO2: 100%   Vitals reviewed and are stable. Constitutional: Well-developed and well-nourished. No acute distress. HENT: Normocephalic and atraumatic. Eyes: Pupils are equal and reactive. No scleral icterus. Neck: Overall appearance is symmetrical. No identifiable masses. Chest: Both breasts are surgically absent with implants in place and with no evidence of localized recurrence. Pulmonary: Effort normal. No respiratory distress. Cardiovascular: RRR. No edema in any of the four extremities. Abdominal: Soft. No hepatomegaly or splenomegaly. Musculoskeletal: Gait is normal. Muscle strength and tone good. Neurological: Alert and oriented to person, place, and time. Judgment and thought content normal.  Skin: Skin is warm and dry. No rash. Psychiatric: Mood and affect appropriate for the clinical situation. Behavior is normal.      Data Analysis:    Hematology 8/27/2019 4/22/2019 6/11/2018   WBC 8.0 10.3 7.6   RBC 4.87 4.83 4.41   HGB 14.4 14.1 13.1   HCT 42.7 42.4 38.5   MCV 88 88 87   RDW 12.4 12.0 11.7    303 288     Assessment:   1. Breast cancer. Recommendations:   1. Monitor for recurrence of breast cancer. 2.  MRI of the breast prior to Kip Montesinos M.D.                                                                          Medical Director: [facility]  Cancer Network [facility]  [address] [employer] [facility], [address] [place] [address], [address] [place] [address], [address] [place] [address], [address] [place] [address] of the [facility] at the [facility]      **This report has been created using voice recognition software. It may contain minor errors which are inherent in voice recognition technology. **

## 2019-09-06 ENCOUNTER — TELEPHONE (OUTPATIENT)
Dept: ENT CLINIC | Age: 44
End: 2019-09-06

## 2019-09-06 DIAGNOSIS — Z86.39 HISTORY OF HYPOTHYROIDISM: Primary | ICD-10-CM

## 2019-09-06 RX ORDER — LEVOTHYROXINE SODIUM 112 UG/1
112 TABLET ORAL DAILY
Qty: 30 TABLET | Refills: 1 | Status: SHIPPED | OUTPATIENT
Start: 2019-09-06 | End: 2022-08-15 | Stop reason: ALTCHOICE

## 2019-09-06 NOTE — TELEPHONE ENCOUNTER
Please call the patient and let her know that her thyroid labs are off still. We are going to increase her dose from 100mcg to 112 mcg. I will place another order for a blood draw to be completed again after 6 weeks of medication (around 10/18/19). If she has further questions about her thyroid test/medications, she can bring that up during her appointment with Dr. Vargas Krause on 9/13.

## 2019-10-21 ENCOUNTER — TELEPHONE (OUTPATIENT)
Dept: ONCOLOGY | Age: 44
End: 2019-10-21

## 2019-10-23 ENCOUNTER — TELEPHONE (OUTPATIENT)
Dept: ONCOLOGY | Age: 44
End: 2019-10-23

## 2019-11-05 DIAGNOSIS — J31.0 CHRONIC RHINITIS: ICD-10-CM

## 2019-11-06 RX ORDER — IPRATROPIUM BROMIDE 42 UG/1
SPRAY, METERED NASAL
Qty: 1 BOTTLE | Refills: 1 | Status: SHIPPED | OUTPATIENT
Start: 2019-11-06 | End: 2022-01-31

## 2019-11-14 DIAGNOSIS — Z85.3 HISTORY OF BREAST CANCER: Primary | ICD-10-CM

## 2019-11-15 ENCOUNTER — HOSPITAL ENCOUNTER (OUTPATIENT)
Dept: MRI IMAGING | Age: 44
Discharge: HOME | End: 2019-11-15
Payer: COMMERCIAL

## 2019-11-15 DIAGNOSIS — Z85.3 HISTORY OF BREAST CANCER: ICD-10-CM

## 2019-11-15 PROCEDURE — 77049 MRI BREAST C-+ W/CAD BI: CPT

## 2019-11-15 PROCEDURE — A9579 GAD-BASE MR CONTRAST NOS,1ML: HCPCS | Performed by: INTERNAL MEDICINE

## 2019-11-15 PROCEDURE — 6360000004 HC RX CONTRAST MEDICATION: Performed by: INTERNAL MEDICINE

## 2019-11-15 RX ADMIN — GADOTERIDOL 10 ML: 279.3 INJECTION, SOLUTION INTRAVENOUS at 16:28

## 2019-11-19 ENCOUNTER — HOSPITAL ENCOUNTER (OUTPATIENT)
Dept: WOMENS IMAGING | Age: 44
Discharge: HOME | End: 2019-11-19
Payer: COMMERCIAL

## 2019-11-19 DIAGNOSIS — R92.8 ABNORMAL MRI, BREAST: ICD-10-CM

## 2019-11-19 PROCEDURE — 76642 ULTRASOUND BREAST LIMITED: CPT

## 2019-11-26 ENCOUNTER — HOSPITAL ENCOUNTER (OUTPATIENT)
Dept: WOMENS IMAGING | Age: 44
Discharge: HOME | End: 2019-11-26
Payer: COMMERCIAL

## 2019-11-26 DIAGNOSIS — N63.20 MASS OF LEFT BREAST: ICD-10-CM

## 2019-11-26 PROCEDURE — 88305 TISSUE EXAM BY PATHOLOGIST: CPT

## 2019-11-26 PROCEDURE — 19083 BX BREAST 1ST LESION US IMAG: CPT

## 2019-11-26 PROCEDURE — A4648 IMPLANTABLE TISSUE MARKER: HCPCS

## 2019-11-26 PROCEDURE — C1894 INTRO/SHEATH, NON-LASER: HCPCS

## 2019-11-26 PROCEDURE — 2709999900 HC NON-CHARGEABLE SUPPLY

## 2020-06-15 ENCOUNTER — HOSPITAL ENCOUNTER (OUTPATIENT)
Dept: INFUSION THERAPY | Age: 45
Discharge: HOME | End: 2020-06-15
Payer: COMMERCIAL

## 2020-06-15 ENCOUNTER — OFFICE VISIT (OUTPATIENT)
Dept: ONCOLOGY | Age: 45
End: 2020-06-15
Payer: COMMERCIAL

## 2020-06-15 VITALS
OXYGEN SATURATION: 97 % | RESPIRATION RATE: 16 BRPM | DIASTOLIC BLOOD PRESSURE: 62 MMHG | BODY MASS INDEX: 24.55 KG/M2 | SYSTOLIC BLOOD PRESSURE: 114 MMHG | TEMPERATURE: 98.6 F | WEIGHT: 143.8 LBS | HEART RATE: 81 BPM | HEIGHT: 64 IN

## 2020-06-15 DIAGNOSIS — Z85.3 HISTORY OF BREAST CANCER: ICD-10-CM

## 2020-06-15 LAB
ABSOLUTE IMMATURE GRANULOCYTE: 0 THOU/MM3 (ref 0–0.07)
ALBUMIN SERPL-MCNC: 4.2 G/DL (ref 3.5–5.1)
ALP BLD-CCNC: 57 U/L (ref 38–126)
ALT SERPL-CCNC: 11 U/L (ref 11–66)
AST SERPL-CCNC: 15 U/L (ref 5–40)
BASINOPHIL, AUTOMATED: 1 % (ref 0–3)
BASOPHILS ABSOLUTE: 0.1 THOU/MM3 (ref 0–0.1)
BILIRUB SERPL-MCNC: 0.2 MG/DL (ref 0.3–1.2)
BILIRUBIN DIRECT: < 0.2 MG/DL (ref 0–0.3)
BUN BLDV-MCNC: 7 MG/DL (ref 7–22)
CHLORIDE, WHOLE BLOOD: 107 MEQ/L (ref 98–109)
CO2: 22 MEQ/L (ref 23–33)
CREATININE, WHOLE BLOOD: 1.1 MG/DL (ref 0.5–1.2)
EOSINOPHILS ABSOLUTE: 0.4 THOU/MM3 (ref 0–0.4)
EOSINOPHILS RELATIVE PERCENT: 5 % (ref 0–4)
GFR, ESTIMATED ,CON: 57 ML/MIN/1.73M2
GLUCOSE, WHOLE BLOOD: 110 MG/DL (ref 70–108)
HCT VFR BLD CALC: 43.7 % (ref 37–47)
HEMOGLOBIN: 13.9 GM/DL (ref 12–16)
IMMATURE GRANULOCYTES: 0 %
IONIZED CALCIUM, WHOLE BLOOD: 1.11 MMOL/L (ref 1.12–1.32)
LYMPHOCYTES # BLD: 34 % (ref 15–47)
LYMPHOCYTES ABSOLUTE: 2.3 THOU/MM3 (ref 1–4.8)
MCH RBC QN AUTO: 29.5 PG (ref 26–33)
MCHC RBC AUTO-ENTMCNC: 31.8 GM/DL (ref 32.2–35.5)
MCV RBC AUTO: 93 FL (ref 81–99)
MONOCYTES ABSOLUTE: 0.4 THOU/MM3 (ref 0.4–1.3)
MONOCYTES: 6 % (ref 0–12)
PDW BLD-RTO: 12.8 % (ref 11.5–14.5)
PLATELET # BLD: 304 THOU/MM3 (ref 130–400)
PMV BLD AUTO: 10.3 FL (ref 9.4–12.4)
POTASSIUM, WHOLE BLOOD: 3.4 MEQ/L (ref 3.5–4.9)
RBC # BLD: 4.71 MILL/MM3 (ref 4.2–5.4)
SEG NEUTROPHILS: 55 % (ref 43–75)
SEGMENTED NEUTROPHILS ABSOLUTE COUNT: 3.7 THOU/MM3 (ref 1.8–7.7)
SODIUM, WHOLE BLOOD: 143 MEQ/L (ref 138–146)
TOTAL PROTEIN: 6.3 G/DL (ref 6.1–8)
WBC # BLD: 6.9 THOU/MM3 (ref 4.8–10.8)

## 2020-06-15 PROCEDURE — 82374 ASSAY BLOOD CARBON DIOXIDE: CPT

## 2020-06-15 PROCEDURE — 99213 OFFICE O/P EST LOW 20 MIN: CPT | Performed by: INTERNAL MEDICINE

## 2020-06-15 PROCEDURE — 80047 BASIC METABLC PNL IONIZED CA: CPT

## 2020-06-15 PROCEDURE — 99211 OFF/OP EST MAY X REQ PHY/QHP: CPT

## 2020-06-15 PROCEDURE — 1036F TOBACCO NON-USER: CPT | Performed by: INTERNAL MEDICINE

## 2020-06-15 PROCEDURE — 85025 COMPLETE CBC W/AUTO DIFF WBC: CPT

## 2020-06-15 PROCEDURE — G8427 DOCREV CUR MEDS BY ELIG CLIN: HCPCS | Performed by: INTERNAL MEDICINE

## 2020-06-15 PROCEDURE — 80076 HEPATIC FUNCTION PANEL: CPT

## 2020-06-15 PROCEDURE — 84520 ASSAY OF UREA NITROGEN: CPT

## 2020-06-15 PROCEDURE — G8420 CALC BMI NORM PARAMETERS: HCPCS | Performed by: INTERNAL MEDICINE

## 2020-06-15 PROCEDURE — 36415 COLL VENOUS BLD VENIPUNCTURE: CPT

## 2020-06-15 NOTE — PROGRESS NOTES
There have been no changes as noted in the previous documentation. Review of Systems  Constitutional: Negative. HENT: Negative. Eyes: Negative. Respiratory: Negative. Cardiovascular: Negative. Gastrointestinal: Negative. Genitourinary: Negative. Musculoskeletal: Negative. Skin: Negative. Neurological: Dizziness, recent concussion. Hematological: Negative. Psychiatric/Behavioral: Negative. Objective:   Physical Exam   Vitals:    06/15/20 1305   BP: 114/62   Pulse: 81   Resp: 16   Temp: 98.6 °F (37 °C)   SpO2: 97%   Vitals reviewed and are stable. Constitutional: Well-developed and well-nourished. No acute distress. HENT: Normocephalic and atraumatic. Eyes: Pupils are equal and reactive. No scleral icterus. Neck: Overall appearance is symmetrical. No identifiable masses. Pulmonary: Effort normal. No respiratory distress. Breasts: Both breasts are surgically absent. Breast implants are in place. No evidence of localized chest wall recurrence. Neurological: Alert and oriented to person, place, and time. Judgment and thought content normal.  Skin: Skin is warm and dry. No rash. Psychiatric: Mood and affect appropriate for the clinical situation. Behavior is normal.      Data Analysis:    Hematology 6/15/2020 8/27/2019 4/22/2019   WBC 6.9 8.0 10.3   RBC 4.71 4.87 4.83   HGB 13.9 14.4 14.1   HCT 43.7 42.7 42.4   MCV 93 88 88   RDW 12.8 12.4 12.0    371 303     Assessment:   1. Breast cancer. Recommendations:   1. Monitor for recurrence of breast cancer. 2.  MRI of the breast prior to Veronica Familia GREGG                                                                          Medical Director: [facility]  Cancer Network [address] [facility], [address] [place] [address], [address] [place] [address], [address] [facility], [address] of the [facility] at the [facility]      **This report has been created using voice recognition software. It may contain minor errors which are inherent in voice recognition technology. **

## 2020-12-11 ENCOUNTER — HOSPITAL ENCOUNTER (OUTPATIENT)
Dept: MRI IMAGING | Age: 45
Discharge: HOME | End: 2020-12-11
Payer: COMMERCIAL

## 2020-12-11 PROCEDURE — A9579 GAD-BASE MR CONTRAST NOS,1ML: HCPCS | Performed by: INTERNAL MEDICINE

## 2020-12-11 PROCEDURE — 77049 MRI BREAST C-+ W/CAD BI: CPT

## 2020-12-11 PROCEDURE — 6360000004 HC RX CONTRAST MEDICATION: Performed by: INTERNAL MEDICINE

## 2020-12-11 RX ADMIN — GADOTERIDOL 14 ML: 279.3 INJECTION, SOLUTION INTRAVENOUS at 16:29

## 2020-12-14 ENCOUNTER — OFFICE VISIT (OUTPATIENT)
Dept: ONCOLOGY | Age: 45
End: 2020-12-14
Payer: COMMERCIAL

## 2020-12-14 ENCOUNTER — HOSPITAL ENCOUNTER (OUTPATIENT)
Dept: INFUSION THERAPY | Age: 45
Discharge: HOME | End: 2020-12-14
Payer: COMMERCIAL

## 2020-12-14 VITALS
TEMPERATURE: 98.6 F | BODY MASS INDEX: 25.68 KG/M2 | RESPIRATION RATE: 16 BRPM | HEART RATE: 87 BPM | HEIGHT: 64 IN | DIASTOLIC BLOOD PRESSURE: 67 MMHG | SYSTOLIC BLOOD PRESSURE: 115 MMHG | WEIGHT: 150.4 LBS | OXYGEN SATURATION: 100 %

## 2020-12-14 DIAGNOSIS — Z85.3 HISTORY OF BREAST CANCER: ICD-10-CM

## 2020-12-14 LAB
ABSOLUTE IMMATURE GRANULOCYTE: 0.01 THOU/MM3 (ref 0–0.07)
BASINOPHIL, AUTOMATED: 1 % (ref 0–3)
BASOPHILS ABSOLUTE: 0.1 THOU/MM3 (ref 0–0.1)
BUN, WHOLE BLOOD: 5 MG/DL (ref 8–26)
CHLORIDE, WHOLE BLOOD: 107 MEQ/L (ref 98–109)
CREATININE, WHOLE BLOOD: 1 MG/DL (ref 0.5–1.2)
EOSINOPHILS ABSOLUTE: 0.3 THOU/MM3 (ref 0–0.4)
EOSINOPHILS RELATIVE PERCENT: 3 % (ref 0–4)
GFR, ESTIMATED ,CON: 63 ML/MIN/1.73M2
GLUCOSE, WHOLE BLOOD: 76 MG/DL (ref 70–108)
HCT VFR BLD CALC: 39.4 % (ref 37–47)
HEMOGLOBIN: 12.9 GM/DL (ref 12–16)
IMMATURE GRANULOCYTES: 0 %
IONIZED CALCIUM, WHOLE BLOOD: 1.19 MMOL/L (ref 1.12–1.32)
LYMPHOCYTES # BLD: 35 % (ref 15–47)
LYMPHOCYTES ABSOLUTE: 2.6 THOU/MM3 (ref 1–4.8)
MCH RBC QN AUTO: 29.8 PG (ref 26–33)
MCHC RBC AUTO-ENTMCNC: 32.7 GM/DL (ref 32.2–35.5)
MCV RBC AUTO: 91 FL (ref 81–99)
MONOCYTES ABSOLUTE: 0.3 THOU/MM3 (ref 0.4–1.3)
MONOCYTES: 5 % (ref 0–12)
PDW BLD-RTO: 13.1 % (ref 11.5–14.5)
PLATELET # BLD: 276 THOU/MM3 (ref 130–400)
PMV BLD AUTO: 10 FL (ref 9.4–12.4)
POTASSIUM, WHOLE BLOOD: 4.4 MEQ/L (ref 3.5–4.9)
RBC # BLD: 4.33 MILL/MM3 (ref 4.2–5.4)
SEG NEUTROPHILS: 57 % (ref 43–75)
SEGMENTED NEUTROPHILS ABSOLUTE COUNT: 4.3 THOU/MM3 (ref 1.8–7.7)
SODIUM, WHOLE BLOOD: 142 MEQ/L (ref 138–146)
TOTAL CO2, WHOLE BLOOD: 24 MEQ/L (ref 23–33)
WBC # BLD: 7.5 THOU/MM3 (ref 4.8–10.8)

## 2020-12-14 PROCEDURE — 80047 BASIC METABLC PNL IONIZED CA: CPT

## 2020-12-14 PROCEDURE — 80076 HEPATIC FUNCTION PANEL: CPT

## 2020-12-14 PROCEDURE — G8484 FLU IMMUNIZE NO ADMIN: HCPCS | Performed by: INTERNAL MEDICINE

## 2020-12-14 PROCEDURE — 36415 COLL VENOUS BLD VENIPUNCTURE: CPT

## 2020-12-14 PROCEDURE — 99211 OFF/OP EST MAY X REQ PHY/QHP: CPT

## 2020-12-14 PROCEDURE — 1036F TOBACCO NON-USER: CPT | Performed by: INTERNAL MEDICINE

## 2020-12-14 PROCEDURE — 85025 COMPLETE CBC W/AUTO DIFF WBC: CPT

## 2020-12-14 PROCEDURE — 99213 OFFICE O/P EST LOW 20 MIN: CPT | Performed by: INTERNAL MEDICINE

## 2020-12-14 PROCEDURE — G8419 CALC BMI OUT NRM PARAM NOF/U: HCPCS | Performed by: INTERNAL MEDICINE

## 2020-12-14 PROCEDURE — G8427 DOCREV CUR MEDS BY ELIG CLIN: HCPCS | Performed by: INTERNAL MEDICINE

## 2020-12-15 ENCOUNTER — TELEPHONE (OUTPATIENT)
Dept: ONCOLOGY | Age: 45
End: 2020-12-15

## 2020-12-15 LAB
ALBUMIN SERPL-MCNC: 4.3 G/DL (ref 3.5–5.1)
ALP BLD-CCNC: 53 U/L (ref 38–126)
ALT SERPL-CCNC: 11 U/L (ref 11–66)
AST SERPL-CCNC: 15 U/L (ref 5–40)
BILIRUB SERPL-MCNC: 0.2 MG/DL (ref 0.3–1.2)
BILIRUBIN DIRECT: < 0.2 MG/DL (ref 0–0.3)
TOTAL PROTEIN: 6.3 G/DL (ref 6.1–8)

## 2020-12-15 NOTE — PROGRESS NOTES
In preparation for their surgical procedure above patient was screened for Obstructive Sleep Apnea (VENU) using the STOP-Bang Questionnaire by the Pre-Admission Testing department. This is a pre-surgical screening tool for patient safety and serves as a recommendation, this WILL NOT cause cancellation of surgery. STOP-Bang Questionnaire  * Do you currently see a pulmonologist?  No       1. Do you snore loudly (able to be heard in the next room)? No    2. Do you often feel tired or sleepy during the daytime? No       3. Has anyone ever told you that you stop breathing during your sleep? No    4. Do you have or are you being treated for high blood pressure? No      5. BMI more than 35? BMI (Calculated): 24.1        No    6. Age over 48 years? 50 y.o. No    7. Neck Circumference greater than 17 inches for male or 16 inches for female? Measured           (visits only)            Not Applicable    8. Gender Male? No      TOTAL SCORE: 0    VENU - Low Risk : Yes to 0 - 2 questions  VENU - Intermediate Risk : Yes to 3 - 4 questions  VENU - High Risk : Yes to 5 - 8 questions    Adapted from:   STOP Questionnaire: A Tool to Screen Patients for Obstructive Sleep Apnea   [employer] Agarwal[employer]P.C., Carolin Maciel M.B.B.S., Lidia Botello M.D., Cynthia Dominguez. Karen Montoya, Ph.D., JAJA Bishop.B.B.S., Breana Morales M.Sc., Scott Last M.D., Nasreen Contreras. [employer]IL[employer] Carrillo.P.C[employer]    Anesthesiology 2008; 344:307-89 Copyright 2008, the [address] of Anesthesiologists, [facility] 37.   ----------------------------------------------------------------------------------------------------------------

## 2020-12-15 NOTE — TELEPHONE ENCOUNTER
Patient called very upset about MRI results. She states she called [facility] today and spoke with a radiologist who read her scans. The radiologist stated that her right implant had ruptured. She is now very upset and wondering why you did not see this and you did not tell her. She is requesting to speak to you in regards to this. Please advise, thank you.

## 2020-12-15 NOTE — PROGRESS NOTES
Patient instructed not to eat or drink anything after midnight the day before surgery. PT. PLANS TO TAKE PAIN MEDICATION IN AM FOR CHRONIC PAIN ISSUES  Please bring list of medications with the dosages & when you take them. If you do not  have a list bring the medications bottles with you. If having a MAC or general anesthetic you MUST have a . Bring photo ID & insurance information. Leave jewelry (watch ,rings, peircings) & other valuables, including extra cash, at home. Wear comfortable clean clothing. When showering or bathing the night before or morning of surgery please use  antibacterial soap. Instructed to call surgery center at [phone] upon arrival to speak with  before entering building. Covid screen . GETTING 12/18/2020      Covid screening questionnaire complete and negative for symptoms or exposure see chart for documentation.

## 2020-12-18 ENCOUNTER — HOSPITAL ENCOUNTER (OUTPATIENT)
Age: 45
Setting detail: SPECIMEN
Discharge: HOME | End: 2020-12-18
Payer: COMMERCIAL

## 2020-12-18 PROCEDURE — U0003 INFECTIOUS AGENT DETECTION BY NUCLEIC ACID (DNA OR RNA); SEVERE ACUTE RESPIRATORY SYNDROME CORONAVIRUS 2 (SARS-COV-2) (CORONAVIRUS DISEASE [COVID-19]), AMPLIFIED PROBE TECHNIQUE, MAKING USE OF HIGH THROUGHPUT TECHNOLOGIES AS DESCRIBED BY CMS-2020-01-R: HCPCS

## 2020-12-20 LAB — SARS-COV-2: NOT DETECTED

## 2020-12-23 ENCOUNTER — ANESTHESIA (OUTPATIENT)
Dept: OPERATING ROOM | Age: 45
End: 2020-12-23
Payer: COMMERCIAL

## 2020-12-23 ENCOUNTER — ANESTHESIA EVENT (OUTPATIENT)
Dept: OPERATING ROOM | Age: 45
End: 2020-12-23
Payer: COMMERCIAL

## 2020-12-23 ENCOUNTER — HOSPITAL ENCOUNTER (OUTPATIENT)
Age: 45
Setting detail: OUTPATIENT SURGERY
Discharge: HOME | End: 2020-12-23
Attending: PODIATRIST | Admitting: PODIATRIST
Payer: COMMERCIAL

## 2020-12-23 VITALS
OXYGEN SATURATION: 98 % | WEIGHT: 140 LBS | HEIGHT: 64 IN | SYSTOLIC BLOOD PRESSURE: 102 MMHG | HEART RATE: 90 BPM | TEMPERATURE: 96.8 F | RESPIRATION RATE: 16 BRPM | BODY MASS INDEX: 23.9 KG/M2 | DIASTOLIC BLOOD PRESSURE: 65 MMHG

## 2020-12-23 VITALS
TEMPERATURE: 98.6 F | SYSTOLIC BLOOD PRESSURE: 112 MMHG | DIASTOLIC BLOOD PRESSURE: 68 MMHG | OXYGEN SATURATION: 100 % | RESPIRATION RATE: 3 BRPM

## 2020-12-23 PROCEDURE — 7100000011 HC PHASE II RECOVERY - ADDTL 15 MIN: Performed by: PODIATRIST

## 2020-12-23 PROCEDURE — 3700000000 HC ANESTHESIA ATTENDED CARE: Performed by: PODIATRIST

## 2020-12-23 PROCEDURE — 7100000010 HC PHASE II RECOVERY - FIRST 15 MIN: Performed by: PODIATRIST

## 2020-12-23 PROCEDURE — C1713 ANCHOR/SCREW BN/BN,TIS/BN: HCPCS | Performed by: PODIATRIST

## 2020-12-23 PROCEDURE — 2500000003 HC RX 250 WO HCPCS: Performed by: PODIATRIST

## 2020-12-23 PROCEDURE — 7100000000 HC PACU RECOVERY - FIRST 15 MIN: Performed by: PODIATRIST

## 2020-12-23 PROCEDURE — 6360000002 HC RX W HCPCS: Performed by: STUDENT IN AN ORGANIZED HEALTH CARE EDUCATION/TRAINING PROGRAM

## 2020-12-23 PROCEDURE — 2580000003 HC RX 258: Performed by: STUDENT IN AN ORGANIZED HEALTH CARE EDUCATION/TRAINING PROGRAM

## 2020-12-23 PROCEDURE — 2500000003 HC RX 250 WO HCPCS: Performed by: NURSE ANESTHETIST, CERTIFIED REGISTERED

## 2020-12-23 PROCEDURE — 3600000014 HC SURGERY LEVEL 4 ADDTL 15MIN: Performed by: PODIATRIST

## 2020-12-23 PROCEDURE — 2709999900 HC NON-CHARGEABLE SUPPLY: Performed by: PODIATRIST

## 2020-12-23 PROCEDURE — 3600000004 HC SURGERY LEVEL 4 BASE: Performed by: PODIATRIST

## 2020-12-23 PROCEDURE — 6370000000 HC RX 637 (ALT 250 FOR IP): Performed by: STUDENT IN AN ORGANIZED HEALTH CARE EDUCATION/TRAINING PROGRAM

## 2020-12-23 PROCEDURE — 6360000002 HC RX W HCPCS: Performed by: NURSE ANESTHETIST, CERTIFIED REGISTERED

## 2020-12-23 PROCEDURE — 3700000001 HC ADD 15 MINUTES (ANESTHESIA): Performed by: PODIATRIST

## 2020-12-23 PROCEDURE — 7100000001 HC PACU RECOVERY - ADDTL 15 MIN: Performed by: PODIATRIST

## 2020-12-23 DEVICE — GRAFT HUM TISS M COVERING WND INVIVO NUCEL: Type: IMPLANTABLE DEVICE | Site: FOOT | Status: FUNCTIONAL

## 2020-12-23 RX ORDER — TRAMADOL HYDROCHLORIDE 50 MG/1
50 TABLET ORAL EVERY 6 HOURS PRN
Status: DISCONTINUED | OUTPATIENT
Start: 2020-12-23 | End: 2020-12-23 | Stop reason: HOSPADM

## 2020-12-23 RX ORDER — SODIUM CHLORIDE 0.9 % (FLUSH) 0.9 %
10 SYRINGE (ML) INJECTION EVERY 12 HOURS SCHEDULED
Status: DISCONTINUED | OUTPATIENT
Start: 2020-12-23 | End: 2020-12-23 | Stop reason: HOSPADM

## 2020-12-23 RX ORDER — FENTANYL CITRATE 50 UG/ML
50 INJECTION, SOLUTION INTRAMUSCULAR; INTRAVENOUS EVERY 5 MIN PRN
Status: DISCONTINUED | OUTPATIENT
Start: 2020-12-23 | End: 2020-12-23 | Stop reason: HOSPADM

## 2020-12-23 RX ORDER — ACETAMINOPHEN 325 MG/1
650 TABLET ORAL EVERY 4 HOURS PRN
Status: DISCONTINUED | OUTPATIENT
Start: 2020-12-23 | End: 2020-12-23 | Stop reason: HOSPADM

## 2020-12-23 RX ORDER — LIDOCAINE HYDROCHLORIDE AND EPINEPHRINE 10; 10 MG/ML; UG/ML
INJECTION, SOLUTION INFILTRATION; PERINEURAL PRN
Status: DISCONTINUED | OUTPATIENT
Start: 2020-12-23 | End: 2020-12-23 | Stop reason: ALTCHOICE

## 2020-12-23 RX ORDER — ONDANSETRON 2 MG/ML
INJECTION INTRAMUSCULAR; INTRAVENOUS PRN
Status: DISCONTINUED | OUTPATIENT
Start: 2020-12-23 | End: 2020-12-23 | Stop reason: SDUPTHER

## 2020-12-23 RX ORDER — ONDANSETRON 2 MG/ML
4 INJECTION INTRAMUSCULAR; INTRAVENOUS EVERY 6 HOURS PRN
Status: DISCONTINUED | OUTPATIENT
Start: 2020-12-23 | End: 2020-12-23 | Stop reason: HOSPADM

## 2020-12-23 RX ORDER — MEPERIDINE HYDROCHLORIDE 25 MG/ML
12.5 INJECTION INTRAMUSCULAR; INTRAVENOUS; SUBCUTANEOUS EVERY 5 MIN PRN
Status: DISCONTINUED | OUTPATIENT
Start: 2020-12-23 | End: 2020-12-23 | Stop reason: HOSPADM

## 2020-12-23 RX ORDER — BUPIVACAINE HYDROCHLORIDE 5 MG/ML
INJECTION, SOLUTION EPIDURAL; INTRACAUDAL PRN
Status: DISCONTINUED | OUTPATIENT
Start: 2020-12-23 | End: 2020-12-23 | Stop reason: ALTCHOICE

## 2020-12-23 RX ORDER — DIPHENHYDRAMINE HYDROCHLORIDE 50 MG/ML
12.5 INJECTION INTRAMUSCULAR; INTRAVENOUS
Status: DISCONTINUED | OUTPATIENT
Start: 2020-12-23 | End: 2020-12-23 | Stop reason: HOSPADM

## 2020-12-23 RX ORDER — LIDOCAINE HYDROCHLORIDE 20 MG/ML
INJECTION, SOLUTION EPIDURAL; INFILTRATION; INTRACAUDAL; PERINEURAL PRN
Status: DISCONTINUED | OUTPATIENT
Start: 2020-12-23 | End: 2020-12-23 | Stop reason: SDUPTHER

## 2020-12-23 RX ORDER — TRAMADOL HYDROCHLORIDE 50 MG/1
100 TABLET ORAL EVERY 6 HOURS PRN
Status: DISCONTINUED | OUTPATIENT
Start: 2020-12-23 | End: 2020-12-23 | Stop reason: HOSPADM

## 2020-12-23 RX ORDER — PROPOFOL 10 MG/ML
INJECTION, EMULSION INTRAVENOUS PRN
Status: DISCONTINUED | OUTPATIENT
Start: 2020-12-23 | End: 2020-12-23 | Stop reason: SDUPTHER

## 2020-12-23 RX ORDER — METOCLOPRAMIDE HYDROCHLORIDE 5 MG/ML
10 INJECTION INTRAMUSCULAR; INTRAVENOUS
Status: DISCONTINUED | OUTPATIENT
Start: 2020-12-23 | End: 2020-12-23 | Stop reason: HOSPADM

## 2020-12-23 RX ORDER — PROMETHAZINE HYDROCHLORIDE 25 MG/1
12.5 TABLET ORAL EVERY 6 HOURS PRN
Status: DISCONTINUED | OUTPATIENT
Start: 2020-12-23 | End: 2020-12-23 | Stop reason: HOSPADM

## 2020-12-23 RX ORDER — LABETALOL 20 MG/4 ML (5 MG/ML) INTRAVENOUS SYRINGE
5 EVERY 10 MIN PRN
Status: DISCONTINUED | OUTPATIENT
Start: 2020-12-23 | End: 2020-12-23 | Stop reason: HOSPADM

## 2020-12-23 RX ORDER — FENTANYL CITRATE 50 UG/ML
25 INJECTION, SOLUTION INTRAMUSCULAR; INTRAVENOUS EVERY 5 MIN PRN
Status: DISCONTINUED | OUTPATIENT
Start: 2020-12-23 | End: 2020-12-23 | Stop reason: HOSPADM

## 2020-12-23 RX ORDER — SODIUM CHLORIDE 9 MG/ML
INJECTION, SOLUTION INTRAVENOUS CONTINUOUS
Status: DISCONTINUED | OUTPATIENT
Start: 2020-12-23 | End: 2020-12-23 | Stop reason: HOSPADM

## 2020-12-23 RX ORDER — MIDAZOLAM HYDROCHLORIDE 1 MG/ML
INJECTION INTRAMUSCULAR; INTRAVENOUS PRN
Status: DISCONTINUED | OUTPATIENT
Start: 2020-12-23 | End: 2020-12-23 | Stop reason: SDUPTHER

## 2020-12-23 RX ORDER — PROMETHAZINE HYDROCHLORIDE 25 MG/ML
12.5 INJECTION, SOLUTION INTRAMUSCULAR; INTRAVENOUS
Status: DISCONTINUED | OUTPATIENT
Start: 2020-12-23 | End: 2020-12-23 | Stop reason: HOSPADM

## 2020-12-23 RX ORDER — SODIUM CHLORIDE 0.9 % (FLUSH) 0.9 %
10 SYRINGE (ML) INJECTION PRN
Status: DISCONTINUED | OUTPATIENT
Start: 2020-12-23 | End: 2020-12-23 | Stop reason: HOSPADM

## 2020-12-23 RX ORDER — FENTANYL CITRATE 50 UG/ML
INJECTION, SOLUTION INTRAMUSCULAR; INTRAVENOUS PRN
Status: DISCONTINUED | OUTPATIENT
Start: 2020-12-23 | End: 2020-12-23 | Stop reason: SDUPTHER

## 2020-12-23 RX ORDER — DEXAMETHASONE SODIUM PHOSPHATE 10 MG/ML
INJECTION, EMULSION INTRAMUSCULAR; INTRAVENOUS PRN
Status: DISCONTINUED | OUTPATIENT
Start: 2020-12-23 | End: 2020-12-23 | Stop reason: SDUPTHER

## 2020-12-23 RX ADMIN — LIDOCAINE HYDROCHLORIDE 4 ML: 20 INJECTION, SOLUTION EPIDURAL; INFILTRATION; INTRACAUDAL; PERINEURAL at 10:24

## 2020-12-23 RX ADMIN — ONDANSETRON HYDROCHLORIDE 4 MG: 4 INJECTION, SOLUTION INTRAMUSCULAR; INTRAVENOUS at 11:07

## 2020-12-23 RX ADMIN — MIDAZOLAM HYDROCHLORIDE 2 MG: 1 INJECTION, SOLUTION INTRAMUSCULAR; INTRAVENOUS at 10:21

## 2020-12-23 RX ADMIN — DEXAMETHASONE SODIUM PHOSPHATE 10 MG: 10 INJECTION, EMULSION INTRAMUSCULAR; INTRAVENOUS at 10:29

## 2020-12-23 RX ADMIN — TRAMADOL HYDROCHLORIDE 50 MG: 50 TABLET ORAL at 12:48

## 2020-12-23 RX ADMIN — FENTANYL CITRATE 100 MCG: 50 INJECTION, SOLUTION INTRAMUSCULAR; INTRAVENOUS at 10:24

## 2020-12-23 RX ADMIN — FENTANYL CITRATE 100 MCG: 50 INJECTION, SOLUTION INTRAMUSCULAR; INTRAVENOUS at 10:36

## 2020-12-23 RX ADMIN — VANCOMYCIN HYDROCHLORIDE 1000 MG: 1 INJECTION, POWDER, LYOPHILIZED, FOR SOLUTION INTRAVENOUS at 10:02

## 2020-12-23 RX ADMIN — SODIUM CHLORIDE: 9 INJECTION, SOLUTION INTRAVENOUS at 10:21

## 2020-12-23 RX ADMIN — TRAMADOL HYDROCHLORIDE 50 MG: 50 TABLET ORAL at 12:09

## 2020-12-23 RX ADMIN — PROPOFOL 200 MG: 10 INJECTION, EMULSION INTRAVENOUS at 10:24

## 2020-12-23 ASSESSMENT — PAIN SCALES - GENERAL
PAINLEVEL_OUTOF10: 7
PAINLEVEL_OUTOF10: 4
PAINLEVEL_OUTOF10: 0

## 2020-12-23 ASSESSMENT — PULMONARY FUNCTION TESTS
PIF_VALUE: 2
PIF_VALUE: 21
PIF_VALUE: 2
PIF_VALUE: 3
PIF_VALUE: 1
PIF_VALUE: 3
PIF_VALUE: 2
PIF_VALUE: 3
PIF_VALUE: 2
PIF_VALUE: 4
PIF_VALUE: 2
PIF_VALUE: 3
PIF_VALUE: 15
PIF_VALUE: 2
PIF_VALUE: 1
PIF_VALUE: 3
PIF_VALUE: 16
PIF_VALUE: 3
PIF_VALUE: 2
PIF_VALUE: 4
PIF_VALUE: 2
PIF_VALUE: 3
PIF_VALUE: 2
PIF_VALUE: 1
PIF_VALUE: 2
PIF_VALUE: 3
PIF_VALUE: 3
PIF_VALUE: 2
PIF_VALUE: 3
PIF_VALUE: 2
PIF_VALUE: 19
PIF_VALUE: 2
PIF_VALUE: 2
PIF_VALUE: 13
PIF_VALUE: 3
PIF_VALUE: 2
PIF_VALUE: 1
PIF_VALUE: 2

## 2020-12-23 ASSESSMENT — PAIN - FUNCTIONAL ASSESSMENT: PAIN_FUNCTIONAL_ASSESSMENT: 0-10

## 2020-12-23 ASSESSMENT — PAIN DESCRIPTION - DESCRIPTORS: DESCRIPTORS: CONSTANT

## 2020-12-23 NOTE — ANESTHESIA POSTPROCEDURE EVALUATION
Department of Anesthesiology  Postprocedure Note    Patient: Liz Barajas  MRN: [id number]  YOB: 1975  Date of evaluation: 12/23/2020  Time:  1:14 PM     Procedure Summary     Date: 12/23/20 Room / Location: [facility] 01 / [facility]    Anesthesia Start: 1021 Anesthesia Stop: 3406    Procedure: CHEILECTOMY 1ST MPJ LEFT FOOT, INJECTION OF Debbi Furry (Left ) Diagnosis: (OSTEOPHYTE, FOOT JOINT LEFT, HALLUX RIGIDUS, LEFT FOOT, PRIMARY OSTEOARTHRITIS, LEFT ANKLE AND FOOT)    Surgeons: Samir Redd DPM Responsible Provider: Felisha Andrade MD    Anesthesia Type: general ASA Status: 2          Anesthesia Type: general    Katie Phase I: Katie Score: 10    Katie Phase II:      Last vitals: Reviewed and per EMR flowsheets. Anesthesia Post Evaluation    Patient location during evaluation: PACU  Patient participation: complete - patient participated  Level of consciousness: awake and alert  Airway patency: patent  Nausea & Vomiting: no nausea and no vomiting  Complications: no  Cardiovascular status: hemodynamically stable  Respiratory status: acceptable  Hydration status: euvolemic      [facility]  POST-ANESTHESIA NOTE       Name:  Liz Barajas                                         Age:  50 y.o.   MRN:  [id number]      Last Vitals:  /65   Pulse 90   Temp 96.8 °F (36 °C) (Temporal)   Resp 16   Ht 5' 4\" (1.626 m)   Wt 140 lb (63.5 kg)   SpO2 98%   BMI 24.03 kg/m²   Patient Vitals for the past 4 hrs:   BP Temp Temp src Pulse Resp SpO2 Height Weight   12/23/20 1155 102/65   90 16 98 %     12/23/20 1150 99/64   88 18 95 %     12/23/20 1145 106/69   90 20 98 %     12/23/20 1140 109/68   93 14 99 %     12/23/20 1135 105/70   90 12 98 %     12/23/20 1130 101/73   95 16 99 %     12/23/20 1125 117/65   111 26 99 %     12/23/20 1120 119/66   102 13 99 %     12/23/20 1115 118/65 96.8 °F (36 °C) Temporal 108 20 99 %   12/23/20 1005 109/64 97.6 °F (36.4 °C) Temporal 95 16 99 % 5' 4\" (1.626 m) 140 lb (63.5 kg)       Level of Consciousness:  Awake    Respiratory:  Stable    Oxygen Saturation:  Stable    Cardiovascular:  Stable    Hydration:  Adequate    PONV:  Stable    Post-op Pain:  Adequate analgesia    Post-op Assessment:  No apparent anesthetic complications    Additional Follow-Up / Treatment / Comment:  None    Mellissa Madrigal MD  December 23, 2020   1:14 PM

## 2020-12-23 NOTE — PROGRESS NOTES
1115 Pt to PACU per cart. Monitor applied. Report from Karen Cervantes and [facility] S[facility] CRNA.> Pt arouses to name. Skin warm and dry. Color pink. resp easy . POX  99% on room air. Left foot elevated on pillow. Foot in ace wrap  - dry and intact. 1120 Pt awake and talkative. Denies pain. Has sensation to last three toes of left foot. Denies nausea. 1135 Pt taking offered ice chips. Pt denies pain. States \" foot tingles a little. \"   2484 Pt resting with eyes closed. Resp easy. POX 98% on room air. 1155 Pt meets requirements to move to phase 2 recovery . 1157 Pt to phase 2 recovery per cart. 1202 Pt taking offered snack and  at bedside. Pt states pain \" 3-4\"   1209 Ultram 50 mg given as ordered for pain .  at bedside. Pt has call light in reach.

## 2020-12-23 NOTE — OP NOTE
Operative Note      Patient: Julio Cesar Marshall  YOB: 1975  MRN: [id number]     Date of Procedure: 12/23/2020     Pre-Op Diagnosis: OSTEOPHYTE, FOOT JOINT LEFT, HALLUX RIGIDUS, LEFT FOOT, PRIMARY OSTEOARTHRITIS, LEFT ANKLE AND FOOT     Post-Op Diagnosis: Same       Procedure(s):  CHEILECTOMY 1ST MPJ LEFT FOOT, INJECTION OF Hector Duran     Surgeon(s):  Park Suarez DPM     Assistant:  Resident: Jennie Phillips DPM; Kirsten Whaley DPM     Anesthesia: General     Estimated Blood Loss (mL): Minimal     Hemostasis: Left Ankle Tourniquet at 250 mmHg     Injectables: 16 cc of 1:1 ratio of 1/2% Marcaine Plain + 1% Lidocaine w/ Epi     Sutures: 3-0 Vicryl and 4-0 Prolene     Complications: None     Specimens: None     Implants:  Implant Name Type Inv. Item Serial No.  Lot No. LRB No. Used Action   GRAFT HUM TISS M COVERING WND INVIVO NUCEL   GRAFT HUM TISS M COVERING WND INVIVO NUCEL   [employer] [facility]- [id number] Left 1 Implanted          Drains: None     Findings: Same as Pre-Op Diagnosis    Detailed Description of Procedure: Indications: Patient is a 50 y.o. y.o. female with a chief complaint of a painful hallux rigidus and OA of the left first toe who is being seen by Dr. Vesna Stone and being treated for 1st left great toe hallux rigidus, OA and osteophyte. At this time all conservative options have been exhausted and surgical intervention is necessary. The procedure has been explained to the patient and they understand the risks, benefits and possible complications including but not limited to pain, infection, irritation, recurrence and need for additional surgery. No promises have been made as to surgical outcome.     Procedure: The incision was flushed with copious amounts of sterile saline. The subcutaneous tissues were re-appoximated with 3-0 vicryl. The skin was closed using 4-0 prolene. The left 1st MPJ was identified and biologic was injected dorsally into left 1st MPJ. A post-op injection of 16 cc of 1:1 ratio of 1/2% Marcaine Plain + 1% Lidocaine w/ Epi was injected. The incision was dressed with adaptic, 4x4's, kerlix, and ACE. The pneumatic left ankle tourniquet was then deflated and an immediate hyperemic response was noted to all digits of the left foot. The patient was transported to the PACU with VSS and NVS intact to all digits of the left foot. No complications were noted throughout the procedure. The patient is to be discharged per PACU protocol. The patient is to follow-up with Dr. Rody Hebert in the office.     Dr. Rhina Delaney DPM    Electronically signed by Laine Bloch, DPM on 12/23/2020 at 11:12 AM

## 2020-12-23 NOTE — PROGRESS NOTES
26 Pt assisted to bathroom per wheelchair. Voided. 18 Pt returned to cart and dressed with  at bedside. 1235 Resting with  at bedside. 1248 Tramadol 50 mg given for increasing pain \"7\" on scale. Pt wishes to speak with Dr Pau Charles when he finishes his current case. Ice pack to behind left knee. 1300 Dr Pau Charles in to speak with pt   1320  Pt states pain 2-3. Wishes to go home. Discharge instructions reviewed with pt and . Both voice understanding. 1325 Pt ambulated to bathroom - surgical shoe on. Gait steady. 1330 Pt discharged per wheelchair to car driven by . Pt alert and stable.

## 2020-12-23 NOTE — BRIEF OP NOTE
Brief Postoperative Note      Patient: Samir Rae  YOB: 1975  MRN: [id number]    Date of Procedure: 12/23/2020    Pre-Op Diagnosis: OSTEOPHYTE, FOOT JOINT LEFT, HALLUX RIGIDUS, LEFT FOOT, PRIMARY OSTEOARTHRITIS, LEFT ANKLE AND FOOT    Post-Op Diagnosis: Same       Procedure(s):  CHEILECTOMY 1ST MPJ LEFT FOOT, INJECTION OF Verlee Dingwall    Surgeon(s):  Jacky El DPM    Assistant:  Resident: Hadley Barcenas DPM; Nehal Aburto DPM    Anesthesia: General    Estimated Blood Loss (mL): Minimal    Hemostasis: Left Ankle Tourniquet at 250 mmHg    Injectables: 16 cc of 1:1 ratio of 1/2% Marcaine Plain + 1% Lidocaine w/ Epi    Sutures: 3-0 Vicryl and 4-0 Prolene    Complications: None    Specimens: None    Implants:  Implant Name Type Inv.  Item Serial No.  Lot No. LRB No. Used Action   GRAFT HUM TISS M COVERING WND INVIVO NUCEL  GRAFT HUM TISS M COVERING WND INVIVO NUCEL  [employer] [facility] [id number] Left 1 Implanted         Drains: None    Findings: Same as Pre-Op Diagnosis    Electronically signed by Nehal Aburto DPM on 12/23/2020 at 11:12 AM

## 2020-12-23 NOTE — H&P
[facility]  History and Physical Update    Pt Name: Ivory Quiroga  MRN: [id number]  YOB: 1975  Date of evaluation: 12/23/2020    [x] I have examined the patient and reviewed the H&P/Consult and there are no changes to the patient or plans.     [] I have examined the patient and reviewed the H&P/Consult and have noted the following changes:        Jacey Alvares DPM  Electronically signed 12/23/2020 at 7:20 AM

## 2020-12-23 NOTE — SUICIDE SAFETY PLAN
SAFETY PLAN    A suicide Safety Plan is a document that supports someone when they are having thoughts of suicide. Warning Signs that indicate a suicidal crisis may be developing: What (situations, thoughts, feelings, body sensations, behaviors, etc.) do you experience that lets you know you are beginning to think about suicide? 1. ***  2. ***  3. ***    Internal Coping Strategies:  What things can I do (relaxation techniques, hobbies, physical activities, etc.) to take my mind off my problems without contacting another person? 1. ***  2. ***  3. ***    People and social settings that provide distraction: Who can I call or where can I go to distract me? 1. Name: ***  Phone: ***  2. Name: ***  Phone: ***   3. Place: ***            4. Place: ***    People whom I can ask for help: Who can I call when I need help - for example, friends, family, clergy, someone else? 1. Name: ***                Phone: ***  2. Name: ***  Phone: ***  3. Name: ***  Phone: ***    Professionals or [facility] agencies I can contact during a crisis: Who can I call for help - for example, my doctor, my psychiatrist, my psychologist, a mental health provider, a suicide hotline? 1. Clinician Name: ***   Phone: ***      Clinician Pager or Emergency Contact #: ***    2. Clinician Name: ***   Phone: ***      Clinician Pager or Emergency Contact #: ***    3. Suicide Prevention Lifeline: 3-739-717-TALK (0619)    4. [address] [place] [address] Emergency Services -  for example, [address] [facility] Emergency suicide hotline, [facility] Hotline: ***      Emergency Services Address: ***      Emergency Services Phone: ***    Making the environment safe: How can I make my environment (house/apartment/living space) safer? For example, can I remove guns, medications, and other items?   1. ***  2. ***

## 2020-12-26 NOTE — PROGRESS NOTES
[facility] CANCER CENTER  CANCER NETWORK OF [facility]  [facility] [address] [facility]  [address]  [address]  Dept: [phone]  Dept Fax: [phone]  Loc: [phone]    Subjective:      Chief Complaint: Michael Paniagua is a 50 y.o. female with breast cancer. She will has a past history of breast cancer dating back to 2009. In March 2009, the patient was noted to have a right breast cancer. She underwent bilateral mastectomy. The tumor in the right breast measured 1.5 cm and was noted to be an invasive carcinoma. One out of sixteen lymph nodes from the right axilla were positive for malignancy. There was no evidence of malignancy involving the left breast.  Her malignancy was noted to be estrogen receptor positive, progesterone receptor positive, and HER-2/mikala overexpression was not amplified. She received six cycles of TAC combination chemotherapy treatment. These were completed between April and November 2009. She was placed on antiestrogen therapy with tamoxifen and completed a full course of therapy. HPI:     The patient is here today for follow examination. She is here for follow up of her history of breast cancer. Her overall health has been good. She has no signs or symptoms that are suggestive of recurrence of her breast cancer. The patient denies skeletal pain. She has not had fever or other signs of infection. The patient denies shortness of breath, chest pain, a change in bowel habits or a change in bladder habits. ECOG performance status is level 0. She had a follow up MRI completed on 12/11/2020. This was reported as a benign appearing MRI with no evidence of malignancy. The was an abnormality associated with her right implant and the patient will follow up with her plastic surgeon for this finding. PMH, SH, and FH:  I reviewed the PMH, SH and FH as noted on the electronic medical record. There have been no changes as noted in the previous documentation. Review of Systems  Constitutional: Negative. HENT: Negative. Eyes: Negative. Respiratory: Negative. Cardiovascular: Negative. Gastrointestinal: Negative. Genitourinary: Negative. Musculoskeletal: Negative. Skin: Negative. Neurological: Negative. Hematological: Negative. Psychiatric/Behavioral: Negative. Objective:   Physical Exam   Vitals:    12/14/20 1302   BP: 115/67   Pulse: 87   Resp: 16   Temp: 98.6 °F (37 °C)   SpO2: 100%   Vitals reviewed and are stable. Constitutional: Well-developed. No acute distress. HENT: Normocephalic and atraumatic. Eyes: Pupils appear equal and reactive. Neck: Overall appearance is symmetrical. No identifiable masses. Pulmonary: Effort normal. No respiratory distress. .  Neurological: Alert and oriented to person, place, and time. Judgment and thought content normal.  Skin: Skin is warm and dry. No rash. Psychiatric: Mood and affect appropriate for the clinical situation. Data Analysis:    Hematology 12/14/2020 6/15/2020 8/27/2019   WBC 7.5 6.9 8.0   RBC 4.33 4.71 4.87   HGB 12.9 13.9 14.4   HCT 39.4 43.7 42.7   MCV 91 93 88   RDW 13.1 12.8 12.4    304 371     Assessment:   1. Breast cancer. Recommendations:   1. Monitor for recurrence of breast cancer. Dany Schaffer M.D. Medical Director: [facility]  Cancer Network [facility]  [address] [address] [facility], [address] [place] [address], [address] [place] [address], [address] [place] [address] of [facility] at [facility]      **This report has been created using voice recognition software.   It may contain minor errors which are inherent in voice recognition technology. **

## 2021-06-14 ENCOUNTER — HOSPITAL ENCOUNTER (OUTPATIENT)
Dept: INFUSION THERAPY | Age: 46
Discharge: HOME | End: 2021-06-14
Payer: COMMERCIAL

## 2021-06-14 ENCOUNTER — OFFICE VISIT (OUTPATIENT)
Dept: ONCOLOGY | Age: 46
End: 2021-06-14
Payer: COMMERCIAL

## 2021-06-14 VITALS
TEMPERATURE: 98.8 F | BODY MASS INDEX: 23.83 KG/M2 | SYSTOLIC BLOOD PRESSURE: 118 MMHG | DIASTOLIC BLOOD PRESSURE: 82 MMHG | WEIGHT: 139.6 LBS | HEART RATE: 103 BPM | HEIGHT: 64 IN | RESPIRATION RATE: 18 BRPM | OXYGEN SATURATION: 100 %

## 2021-06-14 DIAGNOSIS — Z85.3 HISTORY OF BREAST CANCER: Primary | ICD-10-CM

## 2021-06-14 DIAGNOSIS — Z85.3 HISTORY OF BREAST CANCER: ICD-10-CM

## 2021-06-14 LAB
ABSOLUTE IMMATURE GRANULOCYTE: 0 THOU/MM3 (ref 0–0.07)
BASINOPHIL, AUTOMATED: 1 % (ref 0–3)
BASOPHILS ABSOLUTE: 0.1 THOU/MM3 (ref 0–0.1)
BUN, WHOLE BLOOD: 10 MG/DL (ref 8–26)
CHLORIDE, WHOLE BLOOD: 104 MEQ/L (ref 98–109)
CREATININE, WHOLE BLOOD: 1.1 MG/DL (ref 0.5–1.2)
EOSINOPHILS ABSOLUTE: 0.2 THOU/MM3 (ref 0–0.4)
EOSINOPHILS RELATIVE PERCENT: 3 % (ref 0–4)
GFR, ESTIMATED ,CON: 56 ML/MIN/1.73M2
GLUCOSE, WHOLE BLOOD: 143 MG/DL (ref 70–108)
HCT VFR BLD CALC: 39.5 % (ref 37–47)
HEMOGLOBIN: 13.2 GM/DL (ref 12–16)
IMMATURE GRANULOCYTES: 0 %
IONIZED CALCIUM, WHOLE BLOOD: 1.1 MMOL/L (ref 1.12–1.32)
LYMPHOCYTES # BLD: 39 % (ref 15–47)
LYMPHOCYTES ABSOLUTE: 2.5 THOU/MM3 (ref 1–4.8)
MCH RBC QN AUTO: 28.4 PG (ref 26–33)
MCHC RBC AUTO-ENTMCNC: 33.4 GM/DL (ref 32.2–35.5)
MCV RBC AUTO: 85 FL (ref 81–99)
MONOCYTES ABSOLUTE: 0.4 THOU/MM3 (ref 0.4–1.3)
MONOCYTES: 6 % (ref 0–12)
PDW BLD-RTO: 13.3 % (ref 11.5–14.5)
PLATELET # BLD: 386 THOU/MM3 (ref 130–400)
PMV BLD AUTO: 10 FL (ref 9.4–12.4)
POTASSIUM, WHOLE BLOOD: 3.7 MEQ/L (ref 3.5–4.9)
RBC # BLD: 4.64 MILL/MM3 (ref 4.2–5.4)
SEG NEUTROPHILS: 52 % (ref 43–75)
SEGMENTED NEUTROPHILS ABSOLUTE COUNT: 3.3 THOU/MM3 (ref 1.8–7.7)
SODIUM, WHOLE BLOOD: 141 MEQ/L (ref 138–146)
TOTAL CO2, WHOLE BLOOD: 27 MEQ/L (ref 23–33)
WBC # BLD: 6.4 THOU/MM3 (ref 4.8–10.8)

## 2021-06-14 PROCEDURE — G8420 CALC BMI NORM PARAMETERS: HCPCS | Performed by: INTERNAL MEDICINE

## 2021-06-14 PROCEDURE — 99211 OFF/OP EST MAY X REQ PHY/QHP: CPT

## 2021-06-14 PROCEDURE — 99213 OFFICE O/P EST LOW 20 MIN: CPT | Performed by: INTERNAL MEDICINE

## 2021-06-14 PROCEDURE — 36415 COLL VENOUS BLD VENIPUNCTURE: CPT

## 2021-06-14 PROCEDURE — 80047 BASIC METABLC PNL IONIZED CA: CPT

## 2021-06-14 PROCEDURE — 85025 COMPLETE CBC W/AUTO DIFF WBC: CPT

## 2021-06-14 PROCEDURE — G8428 CUR MEDS NOT DOCUMENT: HCPCS | Performed by: INTERNAL MEDICINE

## 2021-06-14 PROCEDURE — 80076 HEPATIC FUNCTION PANEL: CPT

## 2021-06-14 PROCEDURE — 1036F TOBACCO NON-USER: CPT | Performed by: INTERNAL MEDICINE

## 2021-06-14 NOTE — PROGRESS NOTES
[facility] CANCER CENTER  CANCER NETWORK OF [facility]  [facility] [address] [facility]  [address] [facility] [address]  [address]  Dept: [phone]  Dept Fax: [phone]  Loc: [phone]    Subjective:      Chief Complaint: Vidya Chávez is a 48 y.o. female with breast cancer. She will has a past history of breast cancer dating back to 2009. In March 2009, the patient was noted to have a right breast cancer. She underwent bilateral mastectomy. The tumor in the right breast measured 1.5 cm and was noted to be an invasive carcinoma. One out of sixteen lymph nodes from the right axilla were positive for malignancy. There was no evidence of malignancy involving the left breast.  Her malignancy was noted to be estrogen receptor positive, progesterone receptor positive, and HER-2/mikala overexpression was not amplified. She received six cycles of TAC combination chemotherapy treatment. These were completed between April and November 2009. She was placed on antiestrogen therapy with tamoxifen and completed a full course of therapy. HPI:     The patient is here today for follow examination. She is here for follow up of her history of breast cancer. The patient is currently on therapy with Arimidex. She tolerates this well and without side effects. Her overall health has been good. She has no signs or symptoms that are suggestive of recurrence of her breast cancer. The patient denies skeletal pain. She has not had fever or other signs of infection. The patient denies shortness of breath, chest pain, a change in bowel habits or a change in bladder habits. ECOG performance status is level 0. PMH, SH, and FH:  I reviewed the PMH, SH and FH as noted on the electronic medical record. There have been no changes as noted in the previous documentation. Review of Systems  Constitutional: Negative. HENT: Negative. Eyes: Negative. Respiratory: Negative. Cardiovascular: Negative. Gastrointestinal: Negative. Genitourinary: Negative. Musculoskeletal: Negative. Skin: Negative. Neurological: Negative. Hematological: Negative. Psychiatric/Behavioral: Negative. Objective:   Physical Exam   Vitals:    06/14/21 1320   BP: 118/82   Pulse: 103   Resp: 18   Temp: 98.8 °F (37.1 °C)   SpO2: 100%   Vitals reviewed and are stable. Constitutional: Well-developed. No acute distress. HENT: Normocephalic and atraumatic. Eyes: Pupils appear equal and reactive. Neck: Overall appearance is symmetrical. No identifiable masses. Pulmonary: Effort normal. No respiratory distress. Breasts: Both breasts are surgically absent. Bilateral breast implants are in place. Neurological: Alert and oriented to person, place, and time. Judgment and thought content normal.  Skin: Skin is warm and dry. No rash. Psychiatric: Mood and affect appropriate for the clinical situation. Data Analysis:    Hematology 6/14/2021 12/14/2020 6/15/2020   WBC 6.4 7.5 6.9   RBC 4.64 4.33 4.71   HGB 13.2 12.9 13.9   HCT 39.5 39.4 43.7   MCV 85 91 93   RDW 13.3 13.1 12.8    276 304     Assessment:   1. Breast cancer. Recommendations:   1. Monitor for recurrence of breast cancer. 2.  Annual MRI of the Saw Cespedes M.D. Medical Director: [facility]  Cancer Network [facility]  [address] [facility], [address] [place] [address], [address] [place] [address], [address] [place] [address] of [facility] at [facility]      **This report has been created using voice recognition software.   It may contain minor errors which are inherent in voice recognition technology. **

## 2021-06-15 LAB
ALBUMIN SERPL-MCNC: 4.1 G/DL (ref 3.5–5.1)
ALP BLD-CCNC: 68 U/L (ref 38–126)
ALT SERPL-CCNC: 25 U/L (ref 11–66)
AST SERPL-CCNC: 20 U/L (ref 5–40)
BILIRUB SERPL-MCNC: 0.2 MG/DL (ref 0.3–1.2)
BILIRUBIN DIRECT: < 0.2 MG/DL (ref 0–0.3)
TOTAL PROTEIN: 6.1 G/DL (ref 6.1–8)

## 2022-01-31 ENCOUNTER — HOSPITAL ENCOUNTER (OUTPATIENT)
Dept: INFUSION THERAPY | Age: 47
Discharge: HOME | End: 2022-01-31
Payer: COMMERCIAL

## 2022-01-31 ENCOUNTER — OFFICE VISIT (OUTPATIENT)
Dept: ONCOLOGY | Age: 47
End: 2022-01-31
Payer: COMMERCIAL

## 2022-01-31 VITALS
SYSTOLIC BLOOD PRESSURE: 111 MMHG | HEIGHT: 64 IN | HEART RATE: 95 BPM | WEIGHT: 129.8 LBS | DIASTOLIC BLOOD PRESSURE: 77 MMHG | RESPIRATION RATE: 16 BRPM | BODY MASS INDEX: 22.16 KG/M2 | OXYGEN SATURATION: 98 % | TEMPERATURE: 98.3 F

## 2022-01-31 DIAGNOSIS — Z85.3 HISTORY OF BREAST CANCER: Primary | ICD-10-CM

## 2022-01-31 DIAGNOSIS — Z08 ENCOUNTER FOR FOLLOW-UP SURVEILLANCE OF BREAST CANCER: ICD-10-CM

## 2022-01-31 DIAGNOSIS — Z85.3 ENCOUNTER FOR FOLLOW-UP SURVEILLANCE OF BREAST CANCER: ICD-10-CM

## 2022-01-31 DIAGNOSIS — M89.8X9 BONE PAIN: ICD-10-CM

## 2022-01-31 PROCEDURE — G8427 DOCREV CUR MEDS BY ELIG CLIN: HCPCS | Performed by: INTERNAL MEDICINE

## 2022-01-31 PROCEDURE — 99211 OFF/OP EST MAY X REQ PHY/QHP: CPT

## 2022-01-31 PROCEDURE — G8420 CALC BMI NORM PARAMETERS: HCPCS | Performed by: INTERNAL MEDICINE

## 2022-01-31 PROCEDURE — 1036F TOBACCO NON-USER: CPT | Performed by: INTERNAL MEDICINE

## 2022-01-31 PROCEDURE — G8484 FLU IMMUNIZE NO ADMIN: HCPCS | Performed by: INTERNAL MEDICINE

## 2022-01-31 PROCEDURE — 99213 OFFICE O/P EST LOW 20 MIN: CPT | Performed by: INTERNAL MEDICINE

## 2022-01-31 RX ORDER — VENLAFAXINE 75 MG/1
75 TABLET ORAL DAILY
COMMUNITY
End: 2022-08-15 | Stop reason: ALTCHOICE

## 2022-02-15 ENCOUNTER — TELEPHONE (OUTPATIENT)
Dept: ONCOLOGY | Age: 47
End: 2022-02-15

## 2022-02-15 NOTE — TELEPHONE ENCOUNTER
Pt calling the office stating she has been having right hip pain-xray negative but PCP had ordered MRI which was denied by insurance (although approved last year but pt never knew and it wasn't done). Pt wondered if Dr. Marcial Phalen would order so it would get approved. Discussed with pt that approval is determined by codes not the ordering provider and I will discuss with her pcp office the order placed.      Dr. Cassidy Barnett [phone]

## 2022-02-16 DIAGNOSIS — Z85.3 HISTORY OF BREAST CANCER: ICD-10-CM

## 2022-02-16 DIAGNOSIS — M89.8X9 BONE PAIN: Primary | ICD-10-CM

## 2022-02-16 NOTE — PROGRESS NOTES
Subjective:      Gemini De León is a 48 y.o. female with h/o right breast cancer. She is a former Dr. Shayna Hay patient. She would like to establish care with new medical oncologist.  Her history of breast cancer dates back to March 2009. The patient underwent bilateral mastectomy. The tumor in the right breast measured 1.5 cm and was noted to be an invasive carcinoma. One out of sixteen lymph nodes from the right axilla were positive for malignancy. There was no evidence of malignancy involving the left breast.  Her malignancy was noted to be estrogen receptor positive, progesterone receptor positive, and HER-2/mikala not amplified. She received six cycles of  adjuvant TAC chemotherapy treatment. These were completed between April and November 2009. She was placed on antiestrogen therapy with tamoxifen and completed a 5-year course of adjuvant therapy. HPI:       The patient is here today for follow examination while undergoing surveillance for her history of breast cancer. The patient denies any hospitalization within the last 12 months. She does not voice any concerns related to her reconstructed breast.  The patient reports gradually worsening neck pain. .  She has not had fever or other signs of infection. The patient denies shortness of breath, chest pain, a change in bowel habits or a change in bladder habits. ECOG performance status is level 0. PMH, SH, and FH:  I reviewed the PMH, SH and FH as noted on the electronic medical record. There have been no changes as noted in the previous documentation. Review of Systems  Constitutional: Negative. HENT: Negative. Eyes: Negative. Respiratory: Negative. Cardiovascular: Negative. Gastrointestinal: Negative. Genitourinary: Negative. Musculoskeletal: Negative. Skin: Negative. Neurological: Negative. Hematological: Negative. Psychiatric/Behavioral: Negative.         Objective:   Physical Exam   Vitals:    01/31/22 1504   BP: 111/77   Pulse: 95   Resp: 16   Temp: 98.3 °F (36.8 °C)   SpO2: 98%   Vitals reviewed and are stable. Constitutional: Well-developed. No acute distress. HENT: Normocephalic and atraumatic. Eyes: Pupils appear equal and reactive. Neck: Overall appearance is symmetrical. No identifiable masses. Pulmonary: Effort normal. No respiratory distress. Breasts: Both breasts are surgically absent. Bilateral breast implants are in place. No palpable masses or nodules. No axillary lymphadenopathy. Neurological: Alert and oriented to person, place, and time. Judgment and thought content normal.  Skin: Skin is warm and dry. No rash. Psychiatric: Mood and affect appropriate for the clinical situation. Data Analysis:    Hematology 6/14/2021 12/14/2020 6/15/2020   WBC 6.4 7.5 6.9   RBC 4.64 4.33 4.71   HGB 13.2 12.9 13.9   HCT 39.5 39.4 43.7   MCV 85 91 93   RDW 13.3 13.1 12.8    276 304     Assessment/Plan:   1. Breast cancer. The patient underwent bilateral mastectomy in March 2009. The tumor in the right breast measured 1.5 cm and was noted to be an invasive carcinoma. One out of sixteen lymph nodes from the right axilla were positive for malignancy. There was no evidence of malignancy involving the left breast.  Her malignancy was noted to be estrogen receptor positive, progesterone receptor positive, and HER-2/mikala not amplified. She received six cycles of  adjuvant TAC chemotherapy treatment. These were completed between April and November 2009. She was placed on antiestrogen therapy with tamoxifen and completed a 5-year course of adjuvant therapy. 2. Breast cancer surveillance. Management of breast cancer survivors who have completed active treatment and have no evidence of disease are cancer surveillance, lifestyle modifications including pursuing healthy regular exercise program, minimizing alcohol intake, and refraining from smoking.  Survivor follow-up will include updated history, regular physical examination. Radiologic imaging to screen for distant recurrence will be not performed unless the patient will develop new symptoms. Symptoms suspicious for recurrent /metastic disease include:  ?Constitutional symptoms - Anorexia, weight loss, malaise, fatigue, insomnia. ? Bone pain  ? Pulmonary symptoms - persistent cough or dyspnea (at rest or with exertion). ?Neurologic symptoms - Headache, nausea, vomiting, confusion, weakness, numbness or tingling. ?Gastrointestinal symptoms - Right upper quadrant pain, change in bowel habits, presence of bloody or tarry stools. There is no evidence of disease recurrence on today's physical examination. Patient is complaining of bone pain in her neck she will have bone scan.

## 2022-02-16 NOTE — TELEPHONE ENCOUNTER
Discussed with Dr. Araceli Ross who feels bone scan would be more appropriate. Pt agreeable. Would you place order?

## 2022-02-16 NOTE — TELEPHONE ENCOUNTER
Called and spoke with Samantha Encinas at pt's pcp office regarding MRI order, she did confirm that MRI hip order was originally approved but pt never had done, when she tried to recert it was denied by [facility] even using same codes of chronic rt hip pain and hx breast cancer. Would you be willing to order this again? Other option would be pcp to do peer to peer. Please advise.

## 2022-02-25 ENCOUNTER — HOSPITAL ENCOUNTER (OUTPATIENT)
Dept: NUCLEAR MEDICINE | Age: 47
Discharge: HOME | End: 2022-02-25
Payer: COMMERCIAL

## 2022-02-25 DIAGNOSIS — M89.8X9 BONE PAIN: ICD-10-CM

## 2022-02-25 DIAGNOSIS — Z85.3 HISTORY OF BREAST CANCER: ICD-10-CM

## 2022-02-25 PROCEDURE — 78306 BONE IMAGING WHOLE BODY: CPT

## 2022-02-25 PROCEDURE — A9503 TC99M MEDRONATE: HCPCS | Performed by: INTERNAL MEDICINE

## 2022-02-25 PROCEDURE — 3430000000 HC RX DIAGNOSTIC RADIOPHARMACEUTICAL: Performed by: INTERNAL MEDICINE

## 2022-02-25 RX ORDER — TC 99M MEDRONATE 20 MG/10ML
25.7 INJECTION, POWDER, LYOPHILIZED, FOR SOLUTION INTRAVENOUS
Status: COMPLETED | OUTPATIENT
Start: 2022-02-25 | End: 2022-02-25

## 2022-02-25 RX ADMIN — TC 99M MEDRONATE 25.7 MILLICURIE: 20 INJECTION, POWDER, LYOPHILIZED, FOR SOLUTION INTRAVENOUS at 09:00

## 2022-07-25 ENCOUNTER — TELEPHONE (OUTPATIENT)
Dept: ONCOLOGY | Age: 47
End: 2022-07-25

## 2022-08-15 ENCOUNTER — HOSPITAL ENCOUNTER (OUTPATIENT)
Dept: INFUSION THERAPY | Age: 47
Discharge: HOME | End: 2022-08-15
Payer: COMMERCIAL

## 2022-08-15 ENCOUNTER — OFFICE VISIT (OUTPATIENT)
Dept: ONCOLOGY | Age: 47
End: 2022-08-15
Payer: COMMERCIAL

## 2022-08-15 VITALS
WEIGHT: 145.2 LBS | HEIGHT: 64 IN | SYSTOLIC BLOOD PRESSURE: 120 MMHG | DIASTOLIC BLOOD PRESSURE: 86 MMHG | TEMPERATURE: 98 F | BODY MASS INDEX: 24.79 KG/M2 | RESPIRATION RATE: 16 BRPM | HEART RATE: 93 BPM | OXYGEN SATURATION: 100 %

## 2022-08-15 DIAGNOSIS — Z85.3 HISTORY OF BREAST CANCER: Primary | ICD-10-CM

## 2022-08-15 PROCEDURE — G8420 CALC BMI NORM PARAMETERS: HCPCS | Performed by: INTERNAL MEDICINE

## 2022-08-15 PROCEDURE — 99211 OFF/OP EST MAY X REQ PHY/QHP: CPT

## 2022-08-15 PROCEDURE — 1036F TOBACCO NON-USER: CPT | Performed by: INTERNAL MEDICINE

## 2022-08-15 PROCEDURE — 99215 OFFICE O/P EST HI 40 MIN: CPT | Performed by: INTERNAL MEDICINE

## 2022-08-15 PROCEDURE — G8427 DOCREV CUR MEDS BY ELIG CLIN: HCPCS | Performed by: INTERNAL MEDICINE

## 2022-08-15 RX ORDER — ONDANSETRON 4 MG/1
TABLET, ORALLY DISINTEGRATING ORAL PRN
COMMUNITY
Start: 2022-07-10

## 2022-08-15 RX ORDER — DOXEPIN HYDROCHLORIDE 10 MG/1
CAPSULE ORAL NIGHTLY
COMMUNITY
Start: 2022-07-31

## 2022-08-15 RX ORDER — BUSPIRONE HYDROCHLORIDE 10 MG/1
TABLET ORAL 3 TIMES DAILY
COMMUNITY

## 2022-08-15 RX ORDER — PREDNISONE 10 MG/1
TABLET ORAL
COMMUNITY
Start: 2022-08-12

## 2022-08-15 RX ORDER — TEMAZEPAM 15 MG/1
CAPSULE ORAL NIGHTLY
COMMUNITY

## 2022-08-15 RX ORDER — VENLAFAXINE HYDROCHLORIDE 37.5 MG/1
CAPSULE, EXTENDED RELEASE ORAL DAILY
COMMUNITY
Start: 2022-08-11

## 2022-08-15 RX ORDER — IPRATROPIUM BROMIDE 42 UG/1
SPRAY, METERED NASAL
COMMUNITY
Start: 2022-08-05

## 2022-08-15 RX ORDER — LAMOTRIGINE 25 MG/1
TABLET ORAL DAILY
COMMUNITY
Start: 2022-08-03

## 2022-08-15 RX ORDER — SUMATRIPTAN 100 MG/1
TABLET, FILM COATED ORAL PRN
COMMUNITY
Start: 2022-07-06

## 2022-08-15 RX ORDER — LEVOTHYROXINE AND LIOTHYRONINE 9.5; 2.25 UG/1; UG/1
TABLET ORAL DAILY
COMMUNITY

## 2022-08-15 RX ORDER — TOPIRAMATE 100 MG/1
TABLET, FILM COATED ORAL 2 TIMES DAILY
COMMUNITY
Start: 2022-06-17

## 2022-08-15 ASSESSMENT — ENCOUNTER SYMPTOMS
ANAL BLEEDING: 0
VOMITING: 0
ABDOMINAL PAIN: 0
BLOOD IN STOOL: 0
DIARRHEA: 0
COUGH: 0
SHORTNESS OF BREATH: 0
CHOKING: 0
NAUSEA: 0
TROUBLE SWALLOWING: 0

## 2022-08-15 NOTE — PROGRESS NOTES
[address] CANCER [address] [address]  Dept: [phone]  Loc: [address] [place] [address]  1975   No ref. provider found   Rach Tan MD       Brittany Lu is a 50 y.o.  female with right-sided breast cancer    CHIEF COMPLAINT    She is here today in follow-up. HISTORY OF PRESENT ILLNESS    March, 2009. Bilateral mastectomy. Right breast tumor measured 1.5 cm. Invasive ductal carcinoma with 1 out of 16 lymph nodes positive for malignancy. Left breast pathology was negative. Right breast cancer was estrogen receptor positive, progesterone receptor positive, HER2 not amplified. The patient underwent reconstruction with bilateral implants. She has been followed with MRIs. 4/2009 through 11/2009 she received 6 cycles of adjuvant TAC completed. Around November 2009 started tamoxifen for 5 years under the care of Dr. Jorge Benites. 1/17/2013. Vaginal bleeding on tamoxifen status post bilateral mastectomy. She had undergone endometrial ablation in 2011 for heavy vaginal bleeding. She had failure of NovaSure and requested definitive therapy. Ovaries were removed at that time. 7/10/2015. Office visit for weight gain of 40 pounds since her hysterectomy. 9/28/2016. Changed her follow-up to Dr. Judah Hollins. Surveillance continued. Multiple emotional problems. 1/31/2022. Establish care with Dr. Jimmy Polanco with no signs of progressive disease. 2/25/2022. Gradually worsening neck pain for which bone scan was performed which showed no scintigraphic evidence of osseous metastatic disease. The patient has a history of anxiety, insomnia and major depression. INTERVAL NOTE    Today her chief complaint is feeling fat. She has gained on a combination of doxepin, Lamictal and BuSpar. That she was originally started on mirtazapine but this medication was stopped.   These medicines were ordered by her primary care physician. She said that she started having anxiety out of the blue and it was quite distressing to her. Now she cannot deal with the weight gain since the anxiety is under control. She has had insomnia, depression and migraines. Mrs. Elizabeth Chu status post bilateral mastectomies. He is under the care of a plastic surgeon in [place]. She says that she is going to have more fat transplants to fill in some areas in her right reconstructed breast that have dimpled. She missed her MRI last year and this was scheduled for her. She has no signs or symptoms of recurrent breast cancer. She requests laboratory data be done. I told her that her primary care physician may want to draw blood on her with her most recent complaints such as levels of some of these medications so we will hold off doing any blood draws today. She has had continued discomfort in her right hip but has undergone a recent bone scan which was negative for signs of malignancy. He said that this was done instead of MRI which would not be reimbursed by her insurance. She tells me that Dr. Estrella Bailey took her off her tamoxifen after 4 years and she is not sure why. She asks if she should be put back on it now and I told her that that was not appropriate if she had no signs of disease. MONITORING PARAMETERS    Yearly MRIs of the breast.    PAST MEDICAL HISTORY  Past Medical History:   Diagnosis Date    Anxiety     Cancer [facility]) 2009    right breast cancer, genetics negative, had bilat. Mx's with reconstruction    Depression     Migraines     Myofascial muscle pain     Neck pain, chronic     Thyroid disease         REVIEW OF SYSTEMS  Review of Systems   Constitutional:  Negative for activity change, appetite change, chills, fatigue and fever. HENT:  Negative for hearing loss, mouth sores, sneezing and trouble swallowing. Eyes:  Negative for visual disturbance.    Respiratory:  Negative for cough, choking and shortness of breath. Cardiovascular:  Negative for chest pain, palpitations and leg swelling. Gastrointestinal:  Negative for abdominal pain, anal bleeding, blood in stool, diarrhea, nausea and vomiting. Endocrine: Negative for polyuria. Genitourinary:  Negative for frequency. Musculoskeletal:  Positive for myalgias (occasional right hip pain). Skin: Negative. Neurological:  Negative for syncope, numbness and headaches. Hematological:  Negative for adenopathy. Does not bruise/bleed easily. Psychiatric/Behavioral:  Positive for dysphoric mood (just had a change in her antidepressants and stated she was worried that they will make her gain too much weight.). Negative for confusion and sleep disturbance. The patient is nervous/anxious.        FAMILY HISTORY  Family History   Problem Relation Age of Onset    Cancer Maternal Grandmother         breast    Breast Cancer Maternal Grandmother         age 52's    Alzheimer's Disease Maternal Grandfather     Heart Attack Paternal Grandfather     Ovarian Cancer Neg Hx         SOCIAL HISTORY  Social History     Socioeconomic History    Marital status:      Spouse name: Not on file    Number of children: Not on file    Years of education: Not on file    Highest education level: Not on file   Occupational History    Not on file   Tobacco Use    Smoking status: Former     Years: 10.00     Types: Cigarettes     Quit date: 1999     Years since quittin.3    Smokeless tobacco: Never   Substance and Sexual Activity    Alcohol use: No     Alcohol/week: 6.0 standard drinks     Types: 6 Cans of beer per week    Drug use: No    Sexual activity: Yes     Partners: Male   Other Topics Concern    Not on file   Social History Narrative    Not on file     Social Determinants of Health     Financial Resource Strain: Not on file   Food Insecurity: Not on file   Transportation Needs: Not on file   Physical Activity: Not on file   Stress: Not on file   Social Connections: Not on file   Intimate Partner Violence: Not on file   Housing Stability: Not on file        CURRENT MEDICATIONS  Current Outpatient Medications   Medication Sig Dispense Refill    thyroid (ARMOUR THYROID) 15 MG tablet Take by mouth daily      lamoTRIgine (LAMICTAL) 25 MG tablet Take by mouth daily      busPIRone (BUSPAR) 10 MG tablet Take by mouth in the morning, at noon, and at bedtime      doxepin (SINEQUAN) 10 MG capsule Take by mouth nightly      ipratropium (ATROVENT) 0.06 % nasal spray       ondansetron (ZOFRAN-ODT) 4 MG disintegrating tablet as needed      predniSONE (DELTASONE) 10 MG tablet Take by mouth For 7 days      SUMAtriptan (IMITREX) 100 MG tablet as needed      temazepam (RESTORIL) 15 MG capsule Take by mouth nightly. Takes 2      topiramate (TOPAMAX) 100 MG tablet Take by mouth 2 times daily      venlafaxine (EFFEXOR XR) 37.5 MG extended release capsule Take by mouth daily      Erenumab-aooe (AIMOVIG, 140 MG DOSE, SC) Inject into the skin every 30 days      OnabotulinumtoxinA (BOTOX IJ) Inject as directed as needed      rizatriptan (MAXALT-MLT) 10 MG disintegrating tablet Take 10 mg by mouth once as needed       topiramate (TOPAMAX) 50 MG tablet Take 100 mg by mouth 2 times daily       ibuprofen (ADVIL;MOTRIN) 800 MG tablet Take 800 mg by mouth every 6 hours as needed for Pain      traMADol (ULTRAM) 50 MG tablet Take 100 mg by mouth every 6 hours as needed        No current facility-administered medications for this visit. OARRS    PDMP Monitoring: Review of this document shows that she has multiple prescriptions for tramadol Xanax and other medications. PDMP Monitoring:    Last PDMP Lisandra Vance as Reviewed:  Review User Review Instant Review Result   Ana Jesus 8/15/2022 10:46 PM Reviewed PDMP [1]     Last Controlled Substance Monitoring Documentation      [address] Office Visit from 9/11/2017 in [address] [place] [address] Road The Prescription Monitoring Report for this patient was reviewed today. filed at 09/11/2017 1541   Periodic Controlled Substance Monitoring No signs of potential drug abuse or diversion identified. filed at 09/11/2017 1541            Physical Exam  Vitals and nursing note reviewed. Constitutional:       General: She is not in acute distress. Appearance: Normal appearance. She is normal weight. She is not ill-appearing, toxic-appearing or diaphoretic. Comments: She is a well-developed well-nourished, thin  female who is in no acute distress. She has somewhat pressured speech. HENT:      Head: Normocephalic and atraumatic. Nose: Nose normal.      Mouth/Throat:      Mouth: Mucous membranes are moist.      Pharynx: Oropharynx is clear. No oropharyngeal exudate or posterior oropharyngeal erythema. Eyes:      General: No scleral icterus. Extraocular Movements: Extraocular movements intact. Conjunctiva/sclera: Conjunctivae normal.      Pupils: Pupils are equal, round, and reactive to light. Neck:      Comments: No lymphadenopathy  Cardiovascular:      Rate and Rhythm: Normal rate and regular rhythm. Pulses: Normal pulses. Heart sounds: Normal heart sounds. No murmur heard. Pulmonary:      Effort: Pulmonary effort is normal.      Breath sounds: Normal breath sounds. No wheezing, rhonchi or rales. Abdominal:      General: Abdomen is flat. Bowel sounds are normal. There is no distension. Palpations: Abdomen is soft. There is no mass. Tenderness: There is no abdominal tenderness. There is no guarding. Hernia: No hernia is present. Musculoskeletal:         General: No swelling. Normal range of motion. Cervical back: Normal range of motion and neck supple. No rigidity or tenderness. Right lower leg: No edema. Left lower leg: No edema. Lymphadenopathy:      Cervical: No cervical adenopathy. Skin:     General: Skin is warm and dry.       Coloration: Skin is not jaundiced or pale. Findings: No erythema or rash. Comments: Well healed surgical scar from hysterectomy on her abdomen   Neurological:      General: No focal deficit present. Mental Status: She is alert and oriented to person, place, and time. Mental status is at baseline. Cranial Nerves: No cranial nerve deficit. Motor: No weakness. Gait: Gait normal.   Psychiatric:         Mood and Affect: Mood normal.         Behavior: Behavior normal.         Thought Content: Thought content normal.         Judgment: Judgment normal.   She is status post bilateral mastectomies with implants. She has great cosmetic result. There is no evidence of recurrent disease. There is no adenopathy that is clinically enlarged. LABS/IMAGING  No results found. PATHOLOGY/GENETICS    Infiltrating ductal carcinoma of the breast    ASSESSMENT and PLAN    1. History of right sided infiltrating ductal carcinoma of the breast.  She is status post bilateral mastectomies. She has had implants. She had at least 4 years of tamoxifen. She has requested to come back every 6 months despite being told that this is not necessary. She is being followed with yearly MRI. She missed this visit last year but we will go ahead and schedule it soon. 2.  Anxiety. The patient has a great fear of recurrent disease. 3.  Recent weight gain. The profiles of her medications were reviewed and weight gain as a side effect for all 3. She was told to discuss this with the provider who prescribed him. She was told not to stop them abruptly for fear of side effects. 4.  Discomfort in right hip. Is has been persistent but is not severe. Her bone scan was negative earlier this year. She knows that she can call for any change in her status, questions or concerns.         Maren Delvalle MD 8/15/2022 10:47 PM

## 2022-09-22 ENCOUNTER — HOSPITAL ENCOUNTER (OUTPATIENT)
Dept: MRI IMAGING | Age: 47
Discharge: HOME | End: 2022-09-22
Payer: COMMERCIAL

## 2022-09-22 DIAGNOSIS — Z85.3 HISTORY OF BREAST CANCER: ICD-10-CM

## 2022-09-22 PROCEDURE — A9579 GAD-BASE MR CONTRAST NOS,1ML: HCPCS | Performed by: INTERNAL MEDICINE

## 2022-09-22 PROCEDURE — C8908 MRI W/O FOL W/CONT, BREAST,: HCPCS

## 2022-09-22 PROCEDURE — 6360000004 HC RX CONTRAST MEDICATION: Performed by: INTERNAL MEDICINE

## 2022-09-22 RX ADMIN — GADOTERIDOL 15 ML: 279.3 INJECTION, SOLUTION INTRAVENOUS at 16:03

## 2023-01-26 ENCOUNTER — TELEMEDICINE (OUTPATIENT)
Dept: PSYCHIATRY | Age: 48
End: 2023-01-26

## 2023-01-26 DIAGNOSIS — G47.00 INSOMNIA, PERSISTENT: ICD-10-CM

## 2023-01-26 DIAGNOSIS — F33.1 MDD (MAJOR DEPRESSIVE DISORDER), RECURRENT EPISODE, MODERATE (HCC): Primary | ICD-10-CM

## 2023-01-26 DIAGNOSIS — F41.1 GAD (GENERALIZED ANXIETY DISORDER): ICD-10-CM

## 2023-01-26 DIAGNOSIS — F40.02 AGORAPHOBIA WITHOUT PANIC: ICD-10-CM

## 2023-01-26 PROBLEM — F39 UNSPECIFIED MOOD (AFFECTIVE) DISORDER (HCC): Status: ACTIVE | Noted: 2023-01-26

## 2023-01-26 RX ORDER — MIRTAZAPINE 30 MG/1
TABLET, FILM COATED ORAL
COMMUNITY
Start: 2023-01-11

## 2023-01-26 RX ORDER — MIRTAZAPINE 15 MG/1
TABLET, FILM COATED ORAL
COMMUNITY
Start: 2022-11-30 | End: 2023-01-26

## 2023-01-26 RX ORDER — MAGNESIUM OXIDE 400 MG/1
TABLET ORAL
COMMUNITY

## 2023-01-26 RX ORDER — VILAZODONE HYDROCHLORIDE 40 MG/1
TABLET ORAL
COMMUNITY
Start: 2022-12-27

## 2023-01-26 RX ORDER — BUSPIRONE HYDROCHLORIDE 15 MG/1
TABLET ORAL
COMMUNITY
Start: 2023-01-11

## 2023-01-26 ASSESSMENT — PATIENT HEALTH QUESTIONNAIRE - PHQ9
4. FEELING TIRED OR HAVING LITTLE ENERGY: 3
6. FEELING BAD ABOUT YOURSELF - OR THAT YOU ARE A FAILURE OR HAVE LET YOURSELF OR YOUR FAMILY DOWN: 3
SUM OF ALL RESPONSES TO PHQ QUESTIONS 1-9: 16
2. FEELING DOWN, DEPRESSED OR HOPELESS: 3
5. POOR APPETITE OR OVEREATING: 1
SUM OF ALL RESPONSES TO PHQ9 QUESTIONS 1 & 2: 6
8. MOVING OR SPEAKING SO SLOWLY THAT OTHER PEOPLE COULD HAVE NOTICED. OR THE OPPOSITE, BEING SO FIGETY OR RESTLESS THAT YOU HAVE BEEN MOVING AROUND A LOT MORE THAN USUAL: 0
3. TROUBLE FALLING OR STAYING ASLEEP: 3
SUM OF ALL RESPONSES TO PHQ QUESTIONS 1-9: 16
7. TROUBLE CONCENTRATING ON THINGS, SUCH AS READING THE NEWSPAPER OR WATCHING TELEVISION: 0
SUM OF ALL RESPONSES TO PHQ QUESTIONS 1-9: 16
1. LITTLE INTEREST OR PLEASURE IN DOING THINGS: 3
10. IF YOU CHECKED OFF ANY PROBLEMS, HOW DIFFICULT HAVE THESE PROBLEMS MADE IT FOR YOU TO DO YOUR WORK, TAKE CARE OF THINGS AT HOME, OR GET ALONG WITH OTHER PEOPLE: 3
9. THOUGHTS THAT YOU WOULD BE BETTER OFF DEAD, OR OF HURTING YOURSELF: 0
SUM OF ALL RESPONSES TO PHQ QUESTIONS 1-9: 16

## 2023-01-26 ASSESSMENT — ANXIETY QUESTIONNAIRES
GAD7 TOTAL SCORE: 20
2. NOT BEING ABLE TO STOP OR CONTROL WORRYING: 3
3. WORRYING TOO MUCH ABOUT DIFFERENT THINGS: 3
4. TROUBLE RELAXING: 3
1. FEELING NERVOUS, ANXIOUS, OR ON EDGE: 2
5. BEING SO RESTLESS THAT IT IS HARD TO SIT STILL: 3
IF YOU CHECKED OFF ANY PROBLEMS ON THIS QUESTIONNAIRE, HOW DIFFICULT HAVE THESE PROBLEMS MADE IT FOR YOU TO DO YOUR WORK, TAKE CARE OF THINGS AT HOME, OR GET ALONG WITH OTHER PEOPLE: EXTREMELY DIFFICULT
6. BECOMING EASILY ANNOYED OR IRRITABLE: 3
7. FEELING AFRAID AS IF SOMETHING AWFUL MIGHT HAPPEN: 3

## 2023-01-26 NOTE — PROGRESS NOTES
[address] [address]  Dept: [phone]  Dept Fax: [phone]  Loc: [phone]    Visit Date: 1/26/2023    SUBJECTIVE DATA       CHIEF COMPLAINT:    Chief Complaint   Patient presents with    Psychiatric Evaluation    New Patient         History obtained from: patient    HISTORY OF PRESENT ILLNESS:    Vicente Rucker is a 50 y.o. female who presents to the office as new to provider, for medication management. HPI  Patient presents tearful, anxious, and cooperative. Patient states feeling \"anxious\" today. Patient reports her depression and anxiety started when she was diagnosed with breast cancer in 2009. Patient endorses taking Tramadol for years as she had breast cancer with a bilateral mastectomy,now chronic pain. However she stated she doesn't take Tramadol every day. Patient endorses losing her job at [address] [place] [address] in 2010 as she made mistakes while she was going through Chemo for Breast cancer. Gradually worsened over the years, then worsened further over the past 6 months. Denies any situational stressors at that time. Patient stated the past 13 years she doesn't go out of the house very often, no desire or motivation, would rarely leave her room but to go to work. She denies panic attacks when out. She stated \"I'm not sure why\", as she reports not afraid to be around people or in a car, etc. Patient reports she has a history of migraines for past 6 years with chronic pain. She endorses migraines have worsened over the past month, happening every morning. She endorses taking Maxalt, sometimes has to take 2. If this doesn't help then she will also take Imitrex. She stated \"I have to suffer the rest of the day then\" because she can't take more medication. She reports depression and anxiety also worsened around this time and started Remeron. Discussed referral to Neurology.  Patient stated she had an MRI when the migraines started and everything was okay. Discussed symptoms may be part of the depression but will watch and may want to think about referral to neurology. Patient stated in November 2022, all symptoms worsened as she didn't want to leave the house at all, and had to stop working in December 2022. Reports she was missing a lot of work at that time. She stated the past 6 weeks she hasn't left the house at all, and stays in her room most of the day, rarely interacts with family. She stated she feels safe at home in her room. She denies having coping skills, just watches TV. Patient reports having had insomnia for many years too, has tried multiple medications that have not helped except Remeron. However, she has gained 10-20 pounds with Remeron and doesn't like it, but takes it to get some sleep. Patient stated she was on Cymbalta for years, then Effexor for 6 months then switched to [address] as the Effexor wasn't working. Patient stated she was taking 40mg of Viibryd for 10 days and caused worsened insomnia for 4 days in a row, getting about 4-5 hours of sleep, then feeling exhausted, so she decreased dose back to 20mg about 1 week ago and sleep has improved a little. She endorses this happened with another antidepressant when the dose was increased. Discussed as she has tried multiple antidepressant that haven't worked and 4 days in a row of not being able to sleep with an increased dose of antidepressant point toward a mood disorder or bipolar disorder. Patient denies ever having a hypo/manic episode. Will continue to assess at future visits. Discussed medication management with patient. Explained a mood stabilizer may help with  many of her symptoms. Patient agreed with plan to start Lurasidone Thermotony Shay) as this is weight friendly, with the Viibryd to stabilize mood, sleep, anxiety, and agoraphobia. Denies other history of physical, mental, or sexual abuse in her lifetime than what was mentioned above. Patient rates depression 7/10 and anxiety 8/10 on a scale of 0-10 with 10 being the worst.   Sleep-  with medication able to fall asleep, however not able to stay asleep, not feeling rested  Interest- diminished  Energy and motivation - diminished  Concentration - poor  Memory - poor  Appetite- good  Endorses irritability  Endorses restlessness  Endorses hopelessness, helplessness, and worthlessness  Denies suicidal or homicidal ideation, plan, or intent  What keeps you stable/reasons for not harming self? kids  Patient contracts for safety with . Gave suicide hotline #724. Denies auditory, visual, or other hallucinations   Denies paranoid delusions    Motivational Interviewing and Cognitive Behavioral Therapy utilized, including behavioral modification, insight oriented, and supportive therapy. Patient is encouraged to utilize nonpharmacologic coping skills such as deep breathing, guided imagery, guided meditation, muscle relaxation, calming music, and/or journaling. Records review of communications , labs, and medications from an internal/external source completed. PSYCHIATRIC HISTORY:  Patient has had prior care with the following:    [] Psychiatrist      [] Psychologist    [x] Other Therapist through [facility], 1-2 times, also 1 month ago in [facility], but didn't find it helpful, didn't     [] None    The patient has had 1 lifetime suicide attempts. Methods used for the suicide attempts include overdose on xanax.   The patient's most recent suicide attempt was about 7-10 years ago  Patient reports 1 psych hospital admissions, reports she \"took a bunch of xanax\"  Last psychiatric admission 7-10 years ago    Patient endorses currently taking the following psychiatric medications: Buspar 15mg BID, Restoril 15-30mg nightly for insomnia, Viibryd 20mg, Remeron 30mg  Past psychiatric medications include: Effexor (not working), Lamotrigine, Ambien, Sonata, Trintellix, belsomra, requip, ramelteon, Seroquel, ativan, Dayvigo, hydroxyzine, lunesta, quviviq, doxepin. She stated nothing has worked for her insomnia  Adverse reactions from psychotropic medications:  Denies    ALLERGIES:    Cefuroxime axetil     Lifetime Psychiatric Review of Systems:       Obsessions and Compulsions: denies     Kyra or Hypomania: denies     Panic or Anxiety Attacks:  denies     Hallucinations:  denies     Delusions: denies     Phobias:  denies, however she doesn't leave her house in the past 6 weeks     Body or Vocal Tics: denies    SOCIAL HISTORY:  Patient was born in [place], [place] and raised in [address] [place] [address] by her biological parents   Residence: [address] [place] [address] with  and 2 kids, in a house  Children:  2, ages 13,14  Marital Status:   x2,  x1.  Been  for 18 years now  Level of Education:  graduated high school + 2 years   Occupation:  unemployed since 2022, applied for SSD  Patient Assets/Support system:    and children  Endorsed coping skills: watch tv, crying, kids make her laugh  The patient endorses feeling safe at home Yes    Drug Use History:  Tobacco Use Denies  Alcohol Use  Denies  Drug Use  Denies    Legal History:  History of Incarceration: no    Social History     Socioeconomic History    Marital status:      Spouse name: Not on file    Number of children: Not on file    Years of education: Not on file    Highest education level: Not on file   Occupational History    Not on file   Tobacco Use    Smoking status: Former     Years: 10.00     Types: Cigarettes     Quit date: 1999     Years since quittin.8    Smokeless tobacco: Never   Substance and Sexual Activity    Alcohol use: No     Alcohol/week: 6.0 standard drinks     Types: 6 Cans of beer per week    Drug use: No    Sexual activity: Yes     Partners: Male   Other Topics Concern    Not on file   Social History Narrative    Not on file     Social Determinants of Health     Financial Resource Strain: Not on file   Food Insecurity: Not on file   Transportation Needs: Not on file   Physical Activity: Not on file   Stress: Not on file   Social Connections: Not on file   Intimate Partner Violence: Not on file   Housing Stability: Not on file       FAMILY HISTORY:   Family History   Problem Relation Age of Onset    Cancer Maternal Grandmother         breast    Breast Cancer Maternal Grandmother         age 52's    Alzheimer's Disease Maternal Grandfather     Heart Attack Paternal Grandfather     Ovarian Cancer Neg Hx        PSYCHIATRIC FAMILY HISTORY:  Denies  Suicides in family:no  Substance use in family: no    PAST MEDICAL HISTORY:    Past Medical History:   Diagnosis Date    Anxiety     Cancer ([facility] [address] 2009    right breast cancer, genetics negative, had bilat.  Mx's with reconstruction    Depression     Migraines     Myofascial muscle pain     Neck pain, chronic     Thyroid disease        PAST SURGICAL HISTORY:    Past Surgical History:   Procedure Laterality Date    AXILLARY SURGERY      dissection    BREAST RECONSTRUCTION Bilateral     BREAST SURGERY      bilateral masectomies and implants    BREAST SURGERY Bilateral 05/31/2021    BREAST SURGERY  03/2022    grafting    BUNIONECTOMY Left 12/23/2020    CHEILECTOMY 1ST MPJ LEFT FOOT, INJECTION OF NUCELL performed by Ernie Coulter DPM at [address] [place] [address] (CERVIX STATUS UNKNOWN)      OTHER SURGICAL HISTORY  10/17/2013    LAPAROSCOPIC ROBOTIC HYSTERECTOMY AND BILATERAL S  AND O    TUNNELED VENOUS PORT PLACEMENT      also removed       PREVIOUSMEDICATIONS:  Outpatient Medications Prior to Visit   Medication Sig Dispense Refill    vilazodone HCl (VIIBRYD) 40 MG TABS take 1 tablet by mouth once daily WITH A MEAL / FOOD      mirtazapine (REMERON) 30 MG tablet take 1 tablet by mouth at bedtime      magnesium oxide (MAG-OX) 400 MG tablet magnesium oxide 400 mg (241.3 mg magnesium) tablet      Cholecalciferol 50 MCG (2000 UT) CAPS D3-2000 50 mcg (2,000 unit) capsule      busPIRone (BUSPAR) 15 MG tablet take 1 tablet by mouth three times a day      mirtazapine (REMERON) 15 MG tablet take 1 tablet by mouth at bedtime      thyroid (ARMOUR THYROID) 15 MG tablet Take by mouth daily      ipratropium (ATROVENT) 0.06 % nasal spray       ondansetron (ZOFRAN-ODT) 4 MG disintegrating tablet as needed      SUMAtriptan (IMITREX) 100 MG tablet as needed      temazepam (RESTORIL) 15 MG capsule Take by mouth nightly. Takes 2      topiramate (TOPAMAX) 100 MG tablet Take by mouth 2 times daily      lamoTRIgine (LAMICTAL) 25 MG tablet Take by mouth daily      busPIRone (BUSPAR) 10 MG tablet Take by mouth in the morning, at noon, and at bedtime      doxepin (SINEQUAN) 10 MG capsule Take by mouth nightly      predniSONE (DELTASONE) 10 MG tablet Take by mouth For 7 days      venlafaxine (EFFEXOR XR) 37.5 MG extended release capsule Take by mouth daily      Erenumab-aooe (AIMOVIG, 140 MG DOSE, SC) Inject into the skin every 30 days      OnabotulinumtoxinA (BOTOX IJ) Inject as directed as needed      rizatriptan (MAXALT-MLT) 10 MG disintegrating tablet Take 10 mg by mouth once as needed       topiramate (TOPAMAX) 50 MG tablet Take 100 mg by mouth 2 times daily       ibuprofen (ADVIL;MOTRIN) 800 MG tablet Take 800 mg by mouth every 6 hours as needed for Pain      traMADol (ULTRAM) 50 MG tablet Take 100 mg by mouth every 6 hours as needed        No facility-administered medications prior to visit. REVIEW OF SYSTEMS:    Review of Systems   Constitutional:  Positive for appetite change and fatigue. Psychiatric/Behavioral:  Positive for agitation, decreased concentration, dysphoric mood and sleep disturbance. The patient is nervous/anxious. All other systems reviewed and are negative. The patient sees Aleksandar Santos MD as her primary care provider.     SPECIALISTS: Oncologist, Dr. Arlette marroquin for migraines, Plastic surgeon in [place]    OBJECTIVE DATA     There were no vitals taken for this visit. Pain Level: yes - Chronic pain    Wt Readings from Last 3 Encounters:   08/15/22 145 lb 3.2 oz (65.9 kg)   01/31/22 129 lb 12.8 oz (58.9 kg)   06/14/21 139 lb 9.6 oz (63.3 kg)        Physical Exam     Mental Status Exam:   Level of consciousness:  within normal limits  Appearance:  in chair and fair grooming   Behavior/Motor:  no abnormalities noted  Attitude toward examiner:  cooperative, attentive, and good eye contact  Speech:  spontaneous, normal rate, and normal volume  Mood:  \"anxious\" per patient    Anxious, Depressed, and Sad  Affect:  mood congruent and anxious  Thought processes:  linear, goal directed, coherent, and circumstantial  Thought content:  Denies homicidal ideation  Suicidal Ideation:  denies suicidal ideation  Delusions:  no evidence of delusions  Perceptual Disturbance:  denies any perceptual disturbance  Cognition: Patient is oriented to person, place, time and situation  Concentration: poor  Memory: limited  Insight & Judgement: limited   Medication Side Effects: Absent  Attention Span: Attention span and concentration were age appropriate      Screenings Completed in This Encounter:     Anxiety and Depression:      ELISABETH-7 SCREENING 1/26/2023   Feeling nervous, anxious, or on edge More than half the days   Not being able to stop or control worrying Nearly every day   Worrying too much about different things Nearly every day   Trouble relaxing Nearly every day   Being so restless that it is hard to sit still Nearly every day   Becoming easily annoyed or irritable Nearly every day   Feeling afraid as if something awful might happen Nearly every day   ELISABETH-7 Total Score 20   How difficult have these problems made it for you to do your work, take care of things at home, or get along with other people?  Extremely difficult           PHQ-2 Over the past 2 weeks, how often have you been bothered by any of the following problems? Little interest or pleasure in doing things: Nearly every day  Feeling down, depressed, or hopeless: Nearly every day  PHQ-2 Score: 6  PHQ-9 Over the past 2 weeks, how often have you been bothered by any of the following problems? Trouble falling or staying asleep, or sleeping too much: Nearly every day  Feeling tired or having little energy: Nearly every day  Poor appetite or overeating: Several days  Feeling bad about yourself - or that you are a failure or have let yourself or your family down: Nearly every day  Trouble concentrating on things, such as reading the newspaper or watching television: Not at all  Moving or speaking so slowly that other people could have noticed. Or the opposite - being so fidgety or restless that you have been moving around a lot more than usual: Not at all  Thoughts that you would be better off dead, or of hurting yourself in some way: Not at all  If you checked off any problems, how difficult have these problems made it for you to do your work, take care of things at home, or get along with other people?: Extremely dIfficult  PHQ-9 Total Score: 16  PHQ-9 Total Score: 16    DIAGNOSIS AND ASSESSMENT DATA     DSM-5 DIAGNOSIS:   1. MDD (major depressive disorder), recurrent episode, moderate ([facility] [address]    2. ELISABETH (generalized anxiety disorder)    3. Agoraphobia without panic    4. Insomnia, persistent      Rule Out Bipolar Disorder    Psychosocial and Contextual Factors[de-identified]  Financial  Occupational  Relationships  Legal  Living situation  Educational      PLAN   Follow-up:  Return in about 2 weeks (around 2/9/2023) for anxiety, depression, medication management, insomnia.   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 Obtain labs if not completed already: CBC, CMP, Lipid panel, Hepatic Function Panel, TSH, Vitamin D, Vitamin B12, folate  Patient goal: increase coping skills      Prescriptions for this encounter:  New Prescriptions    LURASIDONE (LATUDA) 20 MG TABS TABLET    Take 1 tablet by mouth at bedtime       Orders Placed This Encounter   Medications    lurasidone (LATUDA) 20 MG TABS tablet     Sig: Take 1 tablet by mouth at bedtime     Dispense:  30 tablet     Refill:  1         Medications Discontinued During This Encounter   Medication Reason    venlafaxine (EFFEXOR XR) 37.5 MG extended release capsule LIST CLEANUP    predniSONE (DELTASONE) 10 MG tablet LIST CLEANUP    lamoTRIgine (LAMICTAL) 25 MG tablet LIST CLEANUP    doxepin (SINEQUAN) 10 MG capsule LIST CLEANUP    busPIRone (BUSPAR) 10 MG tablet LIST CLEANUP    mirtazapine (REMERON) 15 MG tablet LIST CLEANUP    ibuprofen (ADVIL;MOTRIN) 800 MG tablet LIST CLEANUP    traMADol (ULTRAM) 50 MG tablet LIST CLEANUP       Additional orders:  No orders of the defined types were placed in this encounter. Antidepressant Medications: We discussed the risks/benefits and side effects of medications. I stressed in particular side effects including but not limited to gastrointestinal problems, sexual dysfunction, serotonin syndrome, agitation, rare induction of [place], rare activation of suicidality. Antipsychotic Medication: We discussed the risks/benefits and side effects of medications. I stressed in particular side effects including but not limited to metabolic syndrome, weight gain, increased prolactin levels, tardive dyskinesia, QTc prolongation, neuroleptic malignant syndrome, excessive somnolence, or confusion.   The risks, side effects, benefits, and alternate treatments of all medications and treatment options discussed at length, including but not limited to, the risks associated with taking these medications during pregnancy. Patient denies intent to become pregnant and agrees to use approved methods of contraception during treatment. She stated understanding and is agreeable to treatment plan. Additionally, she agrees to inform provider immediately upon learning of becoming pregnant, should a pregnancy occur. Pregnancy: Client has been advised that the safety for use of psychotropic medications during pregnancy have not been established. There may be some risk involved in continuance of the medications. Client was advised to utilize contraception while taking psychotropic medications, and to notify as soon as possible if considering pregnancy and/or pregnancy occurs. We discussed the effects alcohol and illicit substances have on mood, thought process, physical health and interactions with medications. Discussed the side effects of nicotine and caffeine in sleep disturbance and anxiety. The client and I reviewed several treatment options to address his/her symptoms. I explained the risks/benefits of treatment with and without medication. The patient participated in and indicated understanding of the content of our discussion by agreeing to the mutually developed treatment plan. Risks, potential side effects, possible drug-drug interactions, benefits and alternate treatments discussed in detail. All questions answered. Patient stated understanding and is agreeable to treatment plan. Patient has been instructed to seek emergency help via the emergency and/or calling 911 should symptoms become severe, worsen, or with other concerning symptoms. Patient instructed to go immediately to the emergency room and/or call 911 with any suicidal or homicidal ideations or if audio/visual hallucinations develop.   Patient stated understanding and agrees. Patient given crisis center information. National Suicide Prevention Lifeline at [facility] (0-483.148.4559) or text HOME to 713236 to access the [address] I spent a total of  75  minutes with the patient and over half of that time was spent on counseling and coordination of care regarding topics discussed above. I spent 65 minutes with the patient for psychotherapy. The patient was engaged and responsive throughout session. Utilized CBT, MI and reflective listening to address topics above. The remainder of session spent on symptom evaluation and medication management. Provider Signature:  Electronically signed by CRISELDA Lino CNP on 1/26/2023 at 11:51 AM    **This report has been created using voice recognition software. It may contain minor errors which are inherent in voice recognition technology. **     Alfredo Jrs, was evaluated through a synchronous (real-time) audio-video encounter. The patient (or guardian if applicable) is aware that this is a billable service, which includes applicable co-pays. This Virtual Visit was conducted with patient's (and/or legal guardian's) consent. The visit was conducted pursuant to the emergency declaration under the [address] waiver authority and the [employer] Act. Patient identification was verified, and a caregiver was present when appropriate. The patient was located at Home: [facility] [id number]. Provider was located at Home ([address] [address]): [place]. Total time spent for this encounter: Not billed by time    --CRISELDA Lino CNP on 1/26/2023 at 11:51 AM    An electronic signature was used to authenticate this note.

## 2023-01-30 ENCOUNTER — TELEPHONE (OUTPATIENT)
Dept: PSYCHIATRY | Age: 48
End: 2023-01-30

## 2023-01-30 DIAGNOSIS — F39 UNSPECIFIED MOOD (AFFECTIVE) DISORDER (HCC): ICD-10-CM

## 2023-01-30 DIAGNOSIS — F33.1 MDD (MAJOR DEPRESSIVE DISORDER), RECURRENT EPISODE, MODERATE (HCC): Primary | ICD-10-CM

## 2023-01-30 RX ORDER — ARIPIPRAZOLE 5 MG/1
5 TABLET ORAL DAILY
Qty: 30 TABLET | Refills: 3 | Status: SHIPPED | OUTPATIENT
Start: 2023-01-30

## 2023-01-30 NOTE — TELEPHONE ENCOUNTER
Patient previously wrote into [employer] requesting an alternative due to cost associated with Latuda out of pocket:    Avel Morrison is over $1000/ mo. and that is with my insurance. There is no generic. With GoodRx its $1400. Is there something we can do? Shane Claude    -Staff encouraged patient to utilize copay savings card from  at that time. Our office received the following message over the weekend:     I tried that, it only takes $400 off. Its still $600.    -Please advise.

## 2023-01-30 NOTE — TELEPHONE ENCOUNTER
Jacqui Shaw,   Please start Abilify 5mg nightly for treatment resistant depression instead of Latuda. I sent prescription to [facility] in [address] [place] [address].    Thank you,  Marvie Klinefelter

## 2023-02-06 ENCOUNTER — TELEPHONE (OUTPATIENT)
Dept: PSYCHIATRY | Age: 48
End: 2023-02-06

## 2023-02-06 DIAGNOSIS — F33.1 MDD (MAJOR DEPRESSIVE DISORDER), RECURRENT EPISODE, MODERATE (HCC): Primary | ICD-10-CM

## 2023-02-06 NOTE — TELEPHONE ENCOUNTER
Angel Ham wrote into the office via [employer]:    Please ask Essie Atkinson what bloodwork she needs. Im getting new bloodwork on Tuesday & I will make sure everything is included to be tested. Thanks,   Angel Ham    --Please advise; per chart there are no pending labs from this provider.  She returns on 02/09/2023

## 2023-02-06 NOTE — PROGRESS NOTES
Labs ordered due to last labs completed 6/2021. Please fast for 12 hours prior, may have water. See if she needs orders sent to home depending on where she is going for lab draw.     Thank you,  Imani Colon

## 2023-02-06 NOTE — PROGRESS NOTES
[address] [address]  Dept: [phone]  Dept Fax: [phone]  Loc: [phone]    Visit Date: 2/9/2023    SUBJECTIVE DATA       CHIEF COMPLAINT:    Chief Complaint   Patient presents with    Follow-up    Medication Check    Medication Refill    Mental Health Problem          History obtained from: patient    HISTORY OF PRESENT ILLNESS:    Minna Conner is a 50 y.o. female who presents to the office for follow up with medication management. HPI  Patient presents calm and cooperative. Patient states feeling \"anxious\" today. Patient stated she is feeling \"the same, no improvement\". She stated she hasn't started the Abilify yet as she wanted to speak with her PCP about stopping the Remeron as she was gaining weight. Patient stated she \"stopped the Remeron yesterday and started Doxepin for sleep because the Restoril doesn't work by itself\". She stated \"It works okay but not as well as the Remeron\", but \"better than the weight gain though\". Discussed medication management with patient. Patient stated her PCP recommended to hold on starting the Abilify so not starting multiple medications at the same time. Encouraged to wait 2 weeks to start the Abilify, from when she started the Doxepin and Restoril, which would be 2/22/23, so not to confuse side effects from different medications. Patient verbalized understanding. Patient agreed with plan to start Abilify 2/22/23, continue viibryd at same dose.      Patient rates depression 7/10 and anxiety 8/10 today on a scale of 0-10 with 10 being the worst.  Sleep-  with medication able to fall asleep, able to stay asleep, feeling rested, wakes up at 4-5am, getting 7-8 hours of continuous sleep  Interest- diminished  Energy and motivation - diminished  Concentration - poor  Memory - poor  Appetite- good  Endorses irritability  Endorses restlessness  Endorses hopelessness, helplessness, and worthlessness  Denies suicidal or homicidal ideation, plan, or intent  What keeps you stable/reasons for not harming self? kids  Patient contracts for safety with . Gave suicide hotline #390. Denies auditory, visual, or other hallucinations   Denies paranoid delusions   What keeps you stable/reasons for not harming self? family  Patient contracts for safety with . Gave suicide hotline #750. Denies auditory, visual, or other hallucinations  Denies paranoid delusions    Patient verbalized her labs were drawn on 2/7//23. She stated her TSH is 10.2 (elevated) was drawn 2 days ago, Free Thyroxyine= .97, T3= 4.2. Explained to reach out to her PCP to discuss elevated TSH since she ordered them. Explained TSH level is elevated which may be causing depressive symptoms and weight gain as well as the Remeron. New medical conditions or psychiatric admissions since seen by this provider last? Denies  Patient endorses currently taking the following psychiatric medications:  Remeron 30mg stopped 2/8/23, Buspar 15mg BID, Viibryd 40mg, Doxepin 10-20mg for sleep with Restoril 15-30mg for sleep, has not started Abilify yet  Patient stated she has been taking NP Thyroid instead of synthroid for past 6-9 months from the PCP. Past psychiatric medications include: Effexor (not working), Lamotrigine, Ambien, Sonata, Trintellix, belsomra, requip, ramelteon, Seroquel, ativan, Dayvigo, hydroxyzine, lunesta, quviviq, doxepin. She stated nothing has worked for her insomnia.  Remeron helpful but caused weight gain  Adverse reactions from psychotropic medications:  Denies  Patient Assets/Support system:     and children  Endorsed coping skills: watch tv, crying, kids make her laugh  The patient endorses feeling safe at home Yes  Current Substance Use: Denies    Motivational Interviewing and Cognitive Behavioral Therapy utilized, including behavioral modification, insight oriented, and supportive therapy. Patient is encouraged to utilize nonpharmacologic coping skills such as deep breathing, guided imagery, guided meditation, muscle relaxation, calming music, and/or journaling. Records review of communications , labs, and medications from an internal/external source completed. ALLERGIES:    Cefuroxime axetil     PAST MEDICAL HISTORY:    Past Medical History:   Diagnosis Date    Anxiety     Cancer ([facility] [address] 2009    right breast cancer, genetics negative, had bilat. Mx's with reconstruction    Depression     Migraines     Myofascial muscle pain     Neck pain, chronic     Thyroid disease        PREVIOUSMEDICATIONS:  Outpatient Medications Prior to Visit   Medication Sig Dispense Refill    doxepin (SINEQUAN) 10 MG capsule take 1 capsule by mouth at bedtime if needed for insomnia      tretinoin (RETIN-A) 0.05 % cream tretinoin 0.05 % topical cream   APPLY TO THE AFFECTED AREA(S) BY TOPICAL ROUTE ONCE DAILY AT BEDTIME      traMADol (ULTRAM) 50 MG tablet take 2 tablet by mouth every 8 hours if needed      ARIPiprazole (ABILIFY) 5 MG tablet Take 1 tablet by mouth daily 30 tablet 3    vilazodone HCl (VIIBRYD) 40 MG TABS take 1 tablet by mouth once daily WITH A MEAL / FOOD      magnesium oxide (MAG-OX) 400 MG tablet magnesium oxide 400 mg (241.3 mg magnesium) tablet      Cholecalciferol 50 MCG (2000 UT) CAPS D3-2000 50 mcg (2,000 unit) capsule      busPIRone (BUSPAR) 15 MG tablet take 1 tablet by mouth three times a day      mirtazapine (REMERON) 30 MG tablet take 1 tablet by mouth at bedtime      thyroid (ARMOUR THYROID) 15 MG tablet Take by mouth daily      ipratropium (ATROVENT) 0.06 % nasal spray       ondansetron (ZOFRAN-ODT) 4 MG disintegrating tablet as needed      SUMAtriptan (IMITREX) 100 MG tablet as needed      temazepam (RESTORIL) 15 MG capsule Take by mouth nightly.  Takes 2      topiramate (TOPAMAX) 100 MG tablet Take by mouth 2 times daily      Erenumab-aooe (AIMOVIG, 140 MG DOSE, SC) Inject into the skin every 30 days      OnabotulinumtoxinA (BOTOX IJ) Inject as directed as needed      rizatriptan (MAXALT-MLT) 10 MG disintegrating tablet Take 10 mg by mouth once as needed       topiramate (TOPAMAX) 50 MG tablet Take 100 mg by mouth 2 times daily        No facility-administered medications prior to visit. REVIEW OF SYSTEMS:    Review of Systems   Constitutional:  Positive for fatigue and unexpected weight change. Psychiatric/Behavioral:  Positive for agitation, decreased concentration, dysphoric mood and sleep disturbance. The patient is nervous/anxious. All other systems reviewed and are negative. The patient sees Radha Correa MD as her primary care provider.     OBJECTIVE DATA     Wt Readings from Last 3 Encounters:   08/15/22 145 lb 3.2 oz (65.9 kg)   01/31/22 129 lb 12.8 oz (58.9 kg)   06/14/21 139 lb 9.6 oz (63.3 kg)        Mental Status Exam:   Level of consciousness:  within normal limits  Appearance:  in chair and fair grooming   Behavior/Motor:  no abnormalities noted  Attitude toward examiner:  cooperative, attentive, and good eye contact  Speech:  spontaneous, normal rate, and normal volume  Mood:  \"anxious\" per patient    Anxious, Depressed, and Sad  Affect:  mood congruent   Thought processes:  linear, goal directed, coherent, and circumstantial  Thought content:  Denies homicidal ideation  Suicidal Ideation:  denies suicidal ideation  Delusions:  no evidence of delusions  Perceptual Disturbance:  denies any perceptual disturbance  Cognition: Patient is oriented to person, place, time and situation  Concentration: poor  Memory: limited  Insight & Judgement: limited   Medication Side Effects: Absent  Attention Span: Attention span and concentration were age appropriate      Screenings Completed in This Encounter:     Anxiety and Depression:      ELISABETH-7 SCREENING 2/9/2023 1/26/2023   Feeling nervous, anxious, or on edge More than half the days More than half the days Not being able to stop or control worrying Nearly every day Nearly every day   Worrying too much about different things Nearly every day Nearly every day   Trouble relaxing Nearly every day Nearly every day   Being so restless that it is hard to sit still Nearly every day Nearly every day   Becoming easily annoyed or irritable Nearly every day Nearly every day   Feeling afraid as if something awful might happen Nearly every day Nearly every day   ELISABETH-7 Total Score 20 20   How difficult have these problems made it for you to do your work, take care of things at home, or get along with other people? Extremely difficult Extremely difficult           PHQ-2 Over the past 2 weeks, how often have you been bothered by any of the following problems? Little interest or pleasure in doing things: Nearly every day  Feeling down, depressed, or hopeless: Nearly every day  PHQ-2 Score: 6  PHQ-9 Over the past 2 weeks, how often have you been bothered by any of the following problems? Trouble falling or staying asleep, or sleeping too much: Nearly every day  Feeling tired or having little energy: Nearly every day  Poor appetite or overeating: Several days  Feeling bad about yourself - or that you are a failure or have let yourself or your family down: Nearly every day  Trouble concentrating on things, such as reading the newspaper or watching television: Not at all  Moving or speaking so slowly that other people could have noticed. Or the opposite - being so fidgety or restless that you have been moving around a lot more than usual: Not at all  Thoughts that you would be better off dead, or of hurting yourself in some way: Not at all  If you checked off any problems, how difficult have these problems made it for you to do your work, take care of things at home, or get along with other people?: Extremely dIfficult  PHQ-9 Total Score: 16  PHQ-9 Total Score: 16       DIAGNOSIS AND ASSESSMENT DATA     DSM-5 DIAGNOSIS:   1.  MDD (major depressive disorder), recurrent episode, moderate ([facility] [address]    2. ELISABETH (generalized anxiety disorder)    3. Agoraphobia without panic    4. Insomnia, persistent      Rule Out Bipolar Disorder     Psychosocial and Contextual Factors[de-identified]  Financial  Occupational  Relationships  Legal  Living situation  Educational    PLAN   Follow-up:  Return in about 4 weeks (around 3/9/2023) for anxiety, depression, mood, medication management. Continue viibryd 20mg daily for depression and anxiety  Start Abilify 5mg daily for mood stability, depression on 2/22/23 after body gets used to the other medications she just started. Continue Buspar 15mg BID for anxiety  Consider psychotherapy  Exercise 30 minutes 3-4 times per week  Increase fluids, drink at least 8 glasses of water daily, no caffeine after 3pm  Sleep hygiene  Healthy diet  Obtain labs if not completed: CBC, CMP, Lipid panel, Hepatic Function Panel, Vitamin D, Vitamin B12, folate  Patient goal prior to next appointment: increase coping skills    Prescriptions for this encounter:  New Prescriptions    No medications on file       No orders of the defined types were placed in this encounter. Medications Discontinued During This Encounter   Medication Reason    mirtazapine (REMERON) 30 MG tablet LIST CLEANUP       Additional orders:  No orders of the defined types were placed in this encounter. Antidepressant Medications: We discussed the risks/benefits and side effects of medications. I stressed in particular side effects including but not limited to gastrointestinal problems, sexual dysfunction, serotonin syndrome, agitation, rare induction of [place], rare activation of suicidality. Antipsychotic Medication: We discussed the risks/benefits and side effects of medications.  I stressed in particular side effects including but not limited to metabolic syndrome, weight gain, increased prolactin levels, tardive dyskinesia, QTc prolongation, neuroleptic malignant syndrome, excessive somnolence, or confusion. The risks, side effects, benefits, and alternate treatments of all medications and treatment options discussed at length, including but not limited to, the risks associated with taking these medications during pregnancy. Patient denies intent to become pregnant and agrees to use approved methods of contraception during treatment. She stated understanding and is agreeable to treatment plan. Additionally, she agrees to inform provider immediately upon learning of becoming pregnant, should a pregnancy occur. Pregnancy: Client has been advised that the safety for use of psychotropic medications during pregnancy have not been established. There may be some risk involved in continuance of the medications. Client was advised to utilize contraception while taking psychotropic medications, and to notify as soon as possible if considering pregnancy and/or pregnancy occurs. We discussed the effects alcohol and illicit substances have on mood, thought process, physical health and interactions with medications. Discussed the side effects of nicotine and caffeine in sleep disturbance and anxiety. The client and I reviewed several treatment options to address his/her symptoms. I explained the risks/benefits of treatment with and without medication. The patient participated in and indicated understanding of the content of our discussion by agreeing to the mutually developed treatment plan. Risks, potential side effects, possible drug-drug interactions, benefits and alternate treatments discussed in detail. All questions answered. Patient stated understanding and is agreeable to treatment plan. Patient has been instructed to seek emergency help via the emergency and/or calling 911 should symptoms become severe, worsen, or with other concerning symptoms.  Patient instructed to go immediately to the emergency room and/or call 911 with any suicidal or homicidal ideations or if audio/visual hallucinations develop. Patient stated understanding and agrees. Patient given crisis center information. National Suicide Prevention Lifeline at #857  or 1-800-273-talk (1-301.874.9524) or text HOME to 483929 to access the [address] I spent a total of 30 minutes with the patient and over half of that time was spent on counseling and coordination of care regarding topics discussed above. I spent 25 minutes with the patient for psychotherapy. The patient was engaged and responsive throughout session. Utilized CBT, MI and reflective listening to address topics above. The remainder of session spent on symptom evaluation and medication management. Provider Signature:  Electronically signed by CRISELDA Deras CNP on 2/9/2023 at 11:29 AM    **This report has been created using voice recognition software. It may contain minor errors which are inherent in voice recognition technology. **     Tiffdiamond Epley, was evaluated through a synchronous (real-time) audio-video encounter. The patient (or guardian if applicable) is aware that this is a billable service, which includes applicable co-pays. This Virtual Visit was conducted with patient's (and/or legal guardian's) consent. The visit was conducted pursuant to the emergency declaration under the [address] [place] [address] authority and the [employer] General Act. Patient identification was verified, and a caregiver was present when appropriate. The patient was located at Home: [facility] [address]  Provider was located at Home ([facility]): [place]         Total time spent for this encounter: Not billed by time    --CRISELDA Deras CNP on 2/9/2023 at 11:29 AM    An electronic signature was used to authenticate this note.

## 2023-02-06 NOTE — TELEPHONE ENCOUNTER
Labs ordered due to last labs completed 6/2021. Please fast for 12 hours prior, may have water. See if she needs orders sent to home depending on where she is going for lab draw.     Thank you,  Glenys Byrne

## 2023-02-09 ENCOUNTER — TELEMEDICINE (OUTPATIENT)
Dept: PSYCHIATRY | Age: 48
End: 2023-02-09

## 2023-02-09 DIAGNOSIS — G47.00 INSOMNIA, PERSISTENT: ICD-10-CM

## 2023-02-09 DIAGNOSIS — F33.1 MDD (MAJOR DEPRESSIVE DISORDER), RECURRENT EPISODE, MODERATE (HCC): Primary | ICD-10-CM

## 2023-02-09 DIAGNOSIS — F40.02 AGORAPHOBIA WITHOUT PANIC: ICD-10-CM

## 2023-02-09 DIAGNOSIS — F41.1 GAD (GENERALIZED ANXIETY DISORDER): ICD-10-CM

## 2023-02-09 RX ORDER — TRAMADOL HYDROCHLORIDE 50 MG/1
TABLET ORAL
COMMUNITY
Start: 2023-01-27

## 2023-02-09 RX ORDER — TRETINOIN 0.5 MG/G
CREAM TOPICAL
COMMUNITY

## 2023-02-09 RX ORDER — DOXEPIN HYDROCHLORIDE 10 MG/1
CAPSULE ORAL
COMMUNITY
Start: 2023-02-07

## 2023-02-09 ASSESSMENT — PATIENT HEALTH QUESTIONNAIRE - PHQ9
SUM OF ALL RESPONSES TO PHQ QUESTIONS 1-9: 16
9. THOUGHTS THAT YOU WOULD BE BETTER OFF DEAD, OR OF HURTING YOURSELF: 0
2. FEELING DOWN, DEPRESSED OR HOPELESS: 3
7. TROUBLE CONCENTRATING ON THINGS, SUCH AS READING THE NEWSPAPER OR WATCHING TELEVISION: 0
5. POOR APPETITE OR OVEREATING: 1
SUM OF ALL RESPONSES TO PHQ QUESTIONS 1-9: 16
4. FEELING TIRED OR HAVING LITTLE ENERGY: 3
3. TROUBLE FALLING OR STAYING ASLEEP: 3
1. LITTLE INTEREST OR PLEASURE IN DOING THINGS: 3
SUM OF ALL RESPONSES TO PHQ9 QUESTIONS 1 & 2: 6
SUM OF ALL RESPONSES TO PHQ QUESTIONS 1-9: 16
10. IF YOU CHECKED OFF ANY PROBLEMS, HOW DIFFICULT HAVE THESE PROBLEMS MADE IT FOR YOU TO DO YOUR WORK, TAKE CARE OF THINGS AT HOME, OR GET ALONG WITH OTHER PEOPLE: 3
6. FEELING BAD ABOUT YOURSELF - OR THAT YOU ARE A FAILURE OR HAVE LET YOURSELF OR YOUR FAMILY DOWN: 3
SUM OF ALL RESPONSES TO PHQ QUESTIONS 1-9: 16
8. MOVING OR SPEAKING SO SLOWLY THAT OTHER PEOPLE COULD HAVE NOTICED. OR THE OPPOSITE, BEING SO FIGETY OR RESTLESS THAT YOU HAVE BEEN MOVING AROUND A LOT MORE THAN USUAL: 0

## 2023-02-09 ASSESSMENT — ANXIETY QUESTIONNAIRES
3. WORRYING TOO MUCH ABOUT DIFFERENT THINGS: 3
GAD7 TOTAL SCORE: 20
7. FEELING AFRAID AS IF SOMETHING AWFUL MIGHT HAPPEN: 3
IF YOU CHECKED OFF ANY PROBLEMS ON THIS QUESTIONNAIRE, HOW DIFFICULT HAVE THESE PROBLEMS MADE IT FOR YOU TO DO YOUR WORK, TAKE CARE OF THINGS AT HOME, OR GET ALONG WITH OTHER PEOPLE: EXTREMELY DIFFICULT
2. NOT BEING ABLE TO STOP OR CONTROL WORRYING: 3
6. BECOMING EASILY ANNOYED OR IRRITABLE: 3
5. BEING SO RESTLESS THAT IT IS HARD TO SIT STILL: 3
4. TROUBLE RELAXING: 3
1. FEELING NERVOUS, ANXIOUS, OR ON EDGE: 2

## 2023-02-09 ASSESSMENT — COLUMBIA-SUICIDE SEVERITY RATING SCALE - C-SSRS
2. HAVE YOU ACTUALLY HAD ANY THOUGHTS OF KILLING YOURSELF?: NO
1. WITHIN THE PAST MONTH, HAVE YOU WISHED YOU WERE DEAD OR WISHED YOU COULD GO TO SLEEP AND NOT WAKE UP?: NO
6. HAVE YOU EVER DONE ANYTHING, STARTED TO DO ANYTHING, OR PREPARED TO DO ANYTHING TO END YOUR LIFE?: NO

## 2023-02-10 ENCOUNTER — NURSE ONLY (OUTPATIENT)
Dept: LAB | Age: 48
End: 2023-02-10

## 2023-02-10 ENCOUNTER — HOSPITAL ENCOUNTER (OUTPATIENT)
Dept: INFUSION THERAPY | Age: 48
Discharge: HOME | End: 2023-02-10
Payer: COMMERCIAL

## 2023-02-10 ENCOUNTER — OFFICE VISIT (OUTPATIENT)
Dept: ONCOLOGY | Age: 48
End: 2023-02-10

## 2023-02-10 VITALS
DIASTOLIC BLOOD PRESSURE: 67 MMHG | HEART RATE: 93 BPM | SYSTOLIC BLOOD PRESSURE: 118 MMHG | RESPIRATION RATE: 18 BRPM | OXYGEN SATURATION: 98 % | TEMPERATURE: 98.4 F

## 2023-02-10 VITALS
DIASTOLIC BLOOD PRESSURE: 67 MMHG | OXYGEN SATURATION: 98 % | BODY MASS INDEX: 26.36 KG/M2 | WEIGHT: 154.4 LBS | TEMPERATURE: 98.4 F | SYSTOLIC BLOOD PRESSURE: 118 MMHG | RESPIRATION RATE: 18 BRPM | HEART RATE: 93 BPM | HEIGHT: 64 IN

## 2023-02-10 DIAGNOSIS — F33.1 MDD (MAJOR DEPRESSIVE DISORDER), RECURRENT EPISODE, MODERATE (HCC): ICD-10-CM

## 2023-02-10 DIAGNOSIS — Z08 ENCOUNTER FOR FOLLOW-UP SURVEILLANCE OF BREAST CANCER: ICD-10-CM

## 2023-02-10 DIAGNOSIS — Z85.3 ENCOUNTER FOR FOLLOW-UP SURVEILLANCE OF BREAST CANCER: ICD-10-CM

## 2023-02-10 DIAGNOSIS — Z85.3 HISTORY OF BREAST CANCER: Primary | ICD-10-CM

## 2023-02-10 LAB
ALBUMIN SERPL BCG-MCNC: 4.1 G/DL (ref 3.5–5.1)
ALP SERPL-CCNC: 88 U/L (ref 38–126)
ALT SERPL W/O P-5'-P-CCNC: 15 U/L (ref 11–66)
ANION GAP SERPL CALC-SCNC: 9 MEQ/L (ref 8–16)
AST SERPL-CCNC: 15 U/L (ref 5–40)
BASOPHILS ABSOLUTE: 0.1 THOU/MM3 (ref 0–0.1)
BASOPHILS NFR BLD AUTO: 1.4 %
BILIRUB CONJ SERPL-MCNC: < 0.2 MG/DL (ref 0–0.3)
BILIRUB SERPL-MCNC: 0.2 MG/DL (ref 0.3–1.2)
BUN SERPL-MCNC: 13 MG/DL (ref 7–22)
CALCIUM SERPL-MCNC: 9.1 MG/DL (ref 8.5–10.5)
CHLORIDE SERPL-SCNC: 106 MEQ/L (ref 98–111)
CHOLEST SERPL-MCNC: 211 MG/DL (ref 100–199)
CO2 SERPL-SCNC: 24 MEQ/L (ref 23–33)
CREAT SERPL-MCNC: 1 MG/DL (ref 0.4–1.2)
DEPRECATED RDW RBC AUTO: 39.9 FL (ref 35–45)
EOSINOPHIL NFR BLD AUTO: 2.2 %
EOSINOPHILS ABSOLUTE: 0.2 THOU/MM3 (ref 0–0.4)
ERYTHROCYTE [DISTWIDTH] IN BLOOD BY AUTOMATED COUNT: 13 % (ref 11.5–14.5)
FOLATE SERPL-MCNC: 6.2 NG/ML (ref 4.8–24.2)
GFR SERPL CREATININE-BSD FRML MDRD: > 60 ML/MIN/1.73M2
GLUCOSE SERPL-MCNC: 92 MG/DL (ref 70–108)
HCT VFR BLD AUTO: 41.4 % (ref 37–47)
HDLC SERPL-MCNC: 96 MG/DL
HGB BLD-MCNC: 13 GM/DL (ref 12–16)
IMM GRANULOCYTES # BLD AUTO: 0.02 THOU/MM3 (ref 0–0.07)
IMM GRANULOCYTES NFR BLD AUTO: 0.3 %
LDLC SERPL CALC-MCNC: 105 MG/DL
LYMPHOCYTES ABSOLUTE: 2.8 THOU/MM3 (ref 1–4.8)
LYMPHOCYTES NFR BLD AUTO: 38.1 %
MCH RBC QN AUTO: 26.6 PG (ref 26–33)
MCHC RBC AUTO-ENTMCNC: 31.4 GM/DL (ref 32.2–35.5)
MCV RBC AUTO: 84.7 FL (ref 81–99)
MONOCYTES ABSOLUTE: 0.6 THOU/MM3 (ref 0.4–1.3)
MONOCYTES NFR BLD AUTO: 7.7 %
NEUTROPHILS NFR BLD AUTO: 50.3 %
NRBC BLD AUTO-RTO: 0 /100 WBC
PLATELET # BLD AUTO: 386 THOU/MM3 (ref 130–400)
PMV BLD AUTO: 10.2 FL (ref 9.4–12.4)
POTASSIUM SERPL-SCNC: 4.4 MEQ/L (ref 3.5–5.2)
PROT SERPL-MCNC: 6.2 G/DL (ref 6.1–8)
RBC # BLD AUTO: 4.89 MILL/MM3 (ref 4.2–5.4)
SEGMENTED NEUTROPHILS ABSOLUTE COUNT: 3.7 THOU/MM3 (ref 1.8–7.7)
SODIUM SERPL-SCNC: 139 MEQ/L (ref 135–145)
TRIGL SERPL-MCNC: 52 MG/DL (ref 0–199)
VIT B12 SERPL-MCNC: 625 PG/ML (ref 211–911)
WBC # BLD AUTO: 7.3 THOU/MM3 (ref 4.8–10.8)

## 2023-02-10 PROCEDURE — 99211 OFF/OP EST MAY X REQ PHY/QHP: CPT

## 2023-02-10 NOTE — PROGRESS NOTES
[facility], [address] [address]  [address] [address]  Dept: [phone]  Dept Fax: [phone]: [phone]      Visit Date:2/10/2023     Nadine Jasmine is a 50 y.o. female who presents today for:   Chief Complaint   Patient presents with    Follow-up     History of breast cancer        HPI:   Nadine Jasmine is a 50 y.o. female followed in the office for history of right breast cancer. Per Dr. Herman Whitney note on 8/15/22:   March, 2009. Bilateral mastectomy. Right breast tumor measured 1.5 cm. Invasive ductal carcinoma with 1 out of 16 lymph nodes positive for malignancy. Left breast pathology was negative. Right breast cancer was estrogen receptor positive, progesterone receptor positive, HER2 not amplified. The patient underwent reconstruction with bilateral implants. She has been followed with MRIs. 4/2009 through 11/2009 she received 6 cycles of adjuvant TAC completed. Around November 2009 started tamoxifen for 5 years under the care of Dr. Claudean Held. 1/17/2013. Vaginal bleeding on tamoxifen status post bilateral mastectomy. She had undergone endometrial ablation in 2011 for heavy vaginal bleeding. She had failure of NovaSure and requested definitive therapy. Ovaries were removed at that time. 7/10/2015. Office visit for weight gain of 40 pounds since her hysterectomy. 9/28/2016. Changed her follow-up to Dr. Lulu Motta. Surveillance continued. Multiple emotional problems. 1/31/2022. Establish care with Dr. Simón Haque with no signs of progressive disease. 2/25/2022. Gradually worsening neck pain for which bone scan was performed which showed no scintigraphic evidence of osseous metastatic disease. The patient has a history of anxiety, insomnia and major depression. Interval History 2/10/2023:   The patient is here for follow up evaluation of right breast cancer. Her last visit was in August 2022 with Dr. Cachorro Valiente.  The patient denies any skin changes to her reconstructed breasts. She denies palpable lumps, bumps. She is following with plastic surgery in [facility] for fat transplants for cosmesis. She denies unintentional weight loss, poor appetite, early satiety. She affirms history of anxiety, depression and has fear of leaving her home at times. She denies regular physical activity. She is currently following with Psychiatry and has had recent medication adjustments. PMH, SH, and FH:  I reviewed the patients medication list and allergy list as noted on the electronic medical record. The PMH, SH and FH were also reviewed as noted on the EMR. Review of Systems:   Review of Systems   Pertinent review of systems noted in HPI, all other ROS negative. Objective:   Physical Exam   /67 (Site: Left Upper Arm, Position: Sitting, Cuff Size: Medium Adult)   Pulse 93   Temp 98.4 °F (36.9 °C) (Oral)   Resp 18   Ht 5' 4\" (1.626 m)   Wt 154 lb 6.4 oz (70 kg)   SpO2 98%   BMI 26.50 kg/m²    General appearance: No apparent distress, well developed, and cooperative. HEENT: Pupils equal, round, and reactive to light. Conjunctivae/corneas clear. Oral mucosa moist.   Neck: Supple, with full range of motion. Trachea midline. Respiratory:  Normal respiratory effort. Clear to auscultation, bilaterally without Rales/Wheezes/Rhonchi. Cardiovascular: Regular rate and rhythm with normal S1/S2   Chest: s/p bilateral mastectomy with reconstruction. No skin changes or palpable masses. Abdomen: Soft, active bowel sounds. Musculoskeletal: No clubbing, cyanosis or edema bilaterally. Skin: Skin color, texture, turgor normal.  No visible rashes or lesions. Neurologic:  Neurovascularly intact without any focal sensory/motor deficits.    Psychiatric: Alert and oriented      Imaging Studies and Labs:   CBC:   Lab Results   Component Value Date    WBC 7.3 02/10/2023    HGB 13.0 02/10/2023    HCT 41.4 02/10/2023    MCV 84.7 02/10/2023     02/10/2023     BMP:   Lab Results   Component Value Date/Time     06/14/2021 01:54 PM     06/24/2016 05:28 PM    K 3.7 06/14/2021 01:54 PM    K 4.2 06/24/2016 05:28 PM     06/24/2016 05:28 PM    CO2 22 06/15/2020 01:09 PM    BUN 7 06/15/2020 01:09 PM    CREATININE 1.1 06/14/2021 01:54 PM    CREATININE 0.8 06/24/2016 05:28 PM    GLUCOSE 115 06/24/2016 05:28 PM    CALCIUM 9.2 06/24/2016 05:28 PM      LFT:   Lab Results   Component Value Date    ALT 25 06/14/2021    AST 20 06/14/2021    ALKPHOS 68 06/14/2021    BILITOT 0.2 (L) 06/14/2021      MRI of breast bilateral on 9/22/22  Impression   No MR evidence of malignancy. Yearly screening MRI is recommended. Assessment and Plan:   History of Right Breast Cancer   S/p bilateral mastectomy in May 2009. Invasive Ductal Carcinoma measuring 1.5 cm, 1/16 lymph nodes positive. ER positive, NV positive, HER-2/mikala negative. She received adjuvant chemotherapy with TAC. She received Tamoxifen for 5 years. She has been followed with surveillance. MRI of breasts on 9/22/2022 showed no MR evidence of malignancy.   -Discussed surveillance with the patient. She prefers every 6 month visit. -Return to clinic in 6 months       All patient questions answered. Pt voiced understanding. Patient agreed with treatment plan. Follow up as directed. Patient instructed to call for questions or concerns.           Electronically signed by   Becki Reno PA-C

## 2023-02-13 ENCOUNTER — TELEPHONE (OUTPATIENT)
Dept: PSYCHIATRY | Age: 48
End: 2023-02-13

## 2023-02-13 NOTE — TELEPHONE ENCOUNTER
Natali hSeth wrote into the office via CellPly:    Dino Angel, I told you that I stopped the Remeron due to weight gain and replaced it with Doxepin as of last Friday. For the last 4 days Ive been very nauseated, sweating, and diarrhea. I feel like its from the Doxepin. Possibly mild Serotonin Syndrome? ? I called my Doc, and she wanted me to ask you if you had any other ideas for sleep, since Ive tried literally everything. Remeron & Restoril worked great together, but separate do not. But had to stop Remeron due to weight gain. Restoril doesnt work by itself. Restoril & Doxepin work ok together, but the Doxepin is making me sick. (I think.)  Please let me know if you have any ideas of anything that would work well with the Restoril. If you need to talk to anyone at my Dr Halbert Mcardle about this, please call Sahra Hutchison CNP. (Dr. Micheal Monsalve is out of the office, but Yola Templeton is familiar with my situation. Thank you,  Natali Sheth    --Please advise; she does not return until 03/10/23.

## 2023-02-13 NOTE — TELEPHONE ENCOUNTER
Doxepin, Vilazodone, and Buspirone can increase serotonin levels possibly causing the symptoms you are experiencing. Please wean down/stop the Doxepin over 1-2 weeks, if you haven't already stopped Doxepin. You have tried most of the medications used for insomnia. Which have you tried previously ---hydroxyzine Hcl or Hydroxyzine Pamoate (Vistaril) or both? I would encourage exercising 3-4 times per week for 20-30 minutes at a time, early in the day. Also Guided Muscle Relaxation before bed is helpful. You can find it on YouTube.     Past psychiatric medications include: Effexor (not working), Lamotrigine, Ambien, Sonata, Trintellix, belsomra, requip, ramelteon, Seroquel, ativan, Dayvigo, hydroxyzine, lunesta, quviviq, doxepin, Remeron    Thank you,  Olimpia Sheikh

## 2023-02-14 NOTE — TELEPHONE ENCOUNTER
I have wrote back to [place] with this information via [employer].

## 2023-03-20 NOTE — PROGRESS NOTES
times daily      Erenumab-aooe (AIMOVIG, 140 MG DOSE, SC) Inject into the skin every 30 days      OnabotulinumtoxinA (BOTOX IJ) Inject as directed as needed      rizatriptan (MAXALT-MLT) 10 MG disintegrating tablet Take 10 mg by mouth once as needed       topiramate (TOPAMAX) 50 MG tablet Take 100 mg by mouth 2 times daily        No facility-administered medications prior to visit. REVIEW OF SYSTEMS:    Review of Systems   Constitutional:  Positive for fatigue. Psychiatric/Behavioral:  Positive for agitation, decreased concentration, dysphoric mood and sleep disturbance. The patient is nervous/anxious. All other systems reviewed and are negative. The patient sees Lisa Mueller MD as her primary care provider.     OBJECTIVE DATA     Wt Readings from Last 3 Encounters:   02/10/23 154 lb 6.4 oz (70 kg)   08/15/22 145 lb 3.2 oz (65.9 kg)   01/31/22 129 lb 12.8 oz (58.9 kg)        Mental Status Exam:   Level of consciousness:  within normal limits  Appearance:  in chair and fair grooming   Behavior/Motor:  no abnormalities noted  Attitude toward examiner:  cooperative, attentive, and good eye contact  Speech:  spontaneous, normal rate, and normal volume  Mood:  \"down\" per patient  Depressed, and Sad  Affect:  mood congruent   Thought processes:  linear, goal directed, coherent, and circumstantial  Thought content:  Denies homicidal ideation  Suicidal Ideation:  denies suicidal ideation  Delusions:  no evidence of delusions  Perceptual Disturbance:  denies any perceptual disturbance  Cognition: Patient is oriented to person, place, time and situation  Concentration: poor  Memory: limited  Insight & Judgement: limited   Medication Side Effects: Absent  Attention Span: Attention span and concentration were age appropriate      Screenings Completed in This Encounter:     Anxiety and Depression:      ELISABETH-7 SCREENING 3/21/2023 2/9/2023 1/26/2023   Feeling nervous, anxious, or on edge More than half the days

## 2023-03-21 ENCOUNTER — TELEMEDICINE (OUTPATIENT)
Dept: PSYCHIATRY | Age: 48
End: 2023-03-21

## 2023-03-21 DIAGNOSIS — G47.00 INSOMNIA, PERSISTENT: ICD-10-CM

## 2023-03-21 DIAGNOSIS — F40.02 AGORAPHOBIA WITHOUT PANIC: ICD-10-CM

## 2023-03-21 DIAGNOSIS — F39 UNSPECIFIED MOOD (AFFECTIVE) DISORDER (HCC): Primary | ICD-10-CM

## 2023-03-21 DIAGNOSIS — F41.1 GAD (GENERALIZED ANXIETY DISORDER): ICD-10-CM

## 2023-03-21 RX ORDER — VILAZODONE HYDROCHLORIDE 20 MG/1
20 TABLET ORAL DAILY
Qty: 30 TABLET | Refills: 2 | Status: SHIPPED | OUTPATIENT
Start: 2023-03-21

## 2023-03-21 RX ORDER — BUSPIRONE HYDROCHLORIDE 15 MG/1
15 TABLET ORAL 3 TIMES DAILY
Qty: 90 TABLET | Refills: 1 | Status: SHIPPED | OUTPATIENT
Start: 2023-03-21

## 2023-03-21 RX ORDER — ARIPIPRAZOLE 10 MG/1
10 TABLET ORAL DAILY
Qty: 30 TABLET | Refills: 2 | Status: SHIPPED | OUTPATIENT
Start: 2023-03-21

## 2023-03-21 ASSESSMENT — ANXIETY QUESTIONNAIRES
6. BECOMING EASILY ANNOYED OR IRRITABLE: 1
7. FEELING AFRAID AS IF SOMETHING AWFUL MIGHT HAPPEN: 3
5. BEING SO RESTLESS THAT IT IS HARD TO SIT STILL: 3
3. WORRYING TOO MUCH ABOUT DIFFERENT THINGS: 3
GAD7 TOTAL SCORE: 18
IF YOU CHECKED OFF ANY PROBLEMS ON THIS QUESTIONNAIRE, HOW DIFFICULT HAVE THESE PROBLEMS MADE IT FOR YOU TO DO YOUR WORK, TAKE CARE OF THINGS AT HOME, OR GET ALONG WITH OTHER PEOPLE: EXTREMELY DIFFICULT
4. TROUBLE RELAXING: 3
2. NOT BEING ABLE TO STOP OR CONTROL WORRYING: 3
1. FEELING NERVOUS, ANXIOUS, OR ON EDGE: 2

## 2023-03-21 ASSESSMENT — PATIENT HEALTH QUESTIONNAIRE - PHQ9
7. TROUBLE CONCENTRATING ON THINGS, SUCH AS READING THE NEWSPAPER OR WATCHING TELEVISION: 0
SUM OF ALL RESPONSES TO PHQ QUESTIONS 1-9: 18
2. FEELING DOWN, DEPRESSED OR HOPELESS: 3
8. MOVING OR SPEAKING SO SLOWLY THAT OTHER PEOPLE COULD HAVE NOTICED. OR THE OPPOSITE, BEING SO FIGETY OR RESTLESS THAT YOU HAVE BEEN MOVING AROUND A LOT MORE THAN USUAL: 3
6. FEELING BAD ABOUT YOURSELF - OR THAT YOU ARE A FAILURE OR HAVE LET YOURSELF OR YOUR FAMILY DOWN: 3
3. TROUBLE FALLING OR STAYING ASLEEP: 3
9. THOUGHTS THAT YOU WOULD BE BETTER OFF DEAD, OR OF HURTING YOURSELF: 0
SUM OF ALL RESPONSES TO PHQ9 QUESTIONS 1 & 2: 6
SUM OF ALL RESPONSES TO PHQ QUESTIONS 1-9: 18
SUM OF ALL RESPONSES TO PHQ QUESTIONS 1-9: 18
5. POOR APPETITE OR OVEREATING: 0
4. FEELING TIRED OR HAVING LITTLE ENERGY: 3
10. IF YOU CHECKED OFF ANY PROBLEMS, HOW DIFFICULT HAVE THESE PROBLEMS MADE IT FOR YOU TO DO YOUR WORK, TAKE CARE OF THINGS AT HOME, OR GET ALONG WITH OTHER PEOPLE: 3
SUM OF ALL RESPONSES TO PHQ QUESTIONS 1-9: 18
1. LITTLE INTEREST OR PLEASURE IN DOING THINGS: 3

## 2023-04-06 ENCOUNTER — HOSPITAL ENCOUNTER (OUTPATIENT)
Dept: MRI IMAGING | Age: 48
Discharge: HOME | End: 2023-04-06
Payer: COMMERCIAL

## 2023-04-06 DIAGNOSIS — M54.12 CERVICAL RADICULOPATHY: ICD-10-CM

## 2023-04-06 PROCEDURE — 72141 MRI NECK SPINE W/O DYE: CPT

## 2023-07-07 DIAGNOSIS — G47.00 INSOMNIA, PERSISTENT: ICD-10-CM

## 2023-07-07 DIAGNOSIS — F39 UNSPECIFIED MOOD (AFFECTIVE) DISORDER (HCC): ICD-10-CM

## 2023-07-07 DIAGNOSIS — F41.1 GAD (GENERALIZED ANXIETY DISORDER): ICD-10-CM

## 2023-07-07 RX ORDER — BUSPIRONE HYDROCHLORIDE 15 MG/1
TABLET ORAL
Qty: 90 TABLET | Refills: 1 | OUTPATIENT
Start: 2023-07-07

## 2023-08-10 ENCOUNTER — OFFICE VISIT (OUTPATIENT)
Dept: ONCOLOGY | Age: 48
End: 2023-08-10
Payer: COMMERCIAL

## 2023-08-10 ENCOUNTER — HOSPITAL ENCOUNTER (OUTPATIENT)
Dept: INFUSION THERAPY | Age: 48
Discharge: HOME | End: 2023-08-10
Payer: COMMERCIAL

## 2023-08-10 VITALS
RESPIRATION RATE: 16 BRPM | HEIGHT: 64 IN | OXYGEN SATURATION: 100 % | HEART RATE: 72 BPM | TEMPERATURE: 98.2 F | SYSTOLIC BLOOD PRESSURE: 100 MMHG | BODY MASS INDEX: 24.48 KG/M2 | WEIGHT: 143.4 LBS | DIASTOLIC BLOOD PRESSURE: 69 MMHG

## 2023-08-10 VITALS
RESPIRATION RATE: 16 BRPM | TEMPERATURE: 98.2 F | SYSTOLIC BLOOD PRESSURE: 100 MMHG | OXYGEN SATURATION: 100 % | HEART RATE: 72 BPM | DIASTOLIC BLOOD PRESSURE: 69 MMHG

## 2023-08-10 DIAGNOSIS — Z85.3 HISTORY OF BREAST CANCER: Primary | ICD-10-CM

## 2023-08-10 PROCEDURE — 1036F TOBACCO NON-USER: CPT | Performed by: INTERNAL MEDICINE

## 2023-08-10 PROCEDURE — 3017F COLORECTAL CA SCREEN DOC REV: CPT | Performed by: INTERNAL MEDICINE

## 2023-08-10 PROCEDURE — 99211 OFF/OP EST MAY X REQ PHY/QHP: CPT

## 2023-08-10 PROCEDURE — G8427 DOCREV CUR MEDS BY ELIG CLIN: HCPCS | Performed by: INTERNAL MEDICINE

## 2023-08-10 PROCEDURE — G8420 CALC BMI NORM PARAMETERS: HCPCS | Performed by: INTERNAL MEDICINE

## 2023-08-10 PROCEDURE — 99213 OFFICE O/P EST LOW 20 MIN: CPT | Performed by: INTERNAL MEDICINE

## 2023-08-10 NOTE — PROGRESS NOTES
[place] [id number]  Dept: [phone]  Loc: [phone]   Hematology/Oncology Consult (Clinic)        08/10/23       Omar John   1975     No ref. provider found   Pau Lewis MD       Reason:   Chief Complaint   Patient presents with    Follow-up     History of breast cancer          HPI: Omar John is a 46 y.o. female with past medical history significant for anxiety, insomnia and major depression who presents for her right breast cancer.     -3/10/2009 right breast mass core needle biopsy showed invasive well-differentiated ductal adenocarcinoma, grade 1. DCIS, grade 2 nuclei, solid pattern, ER positive and 82% of cells, WA positive and 73% of cells, proliferation index 15-25%.  -3/13/2009 right breast core needle biopsy showing benign breast parenchyma and patchy stromal fibrosis    -March, 2009 patient had bilateral mastectomy. Right breast tumor measured 1.5 cm. Invasive ductal carcinoma with 1 out of 16 lymph nodes positive for malignancy. Left breast pathology was negative. Right breast cancer was estrogen receptor positive, progesterone receptor positive, HER2 not amplified. The patient underwent reconstruction with bilateral implants. 4/2009 through 11/2009 she received 6 cycles of adjuvant TAC completed. Around November 2009 started tamoxifen for 5 years under the care of Dr. Sania Fitzpatrick. 10/4/2013 bilateral MRI of breast (status post reconstruction with silicone implants) negative. No malignancy. She also had genetic testing with BRCA1 and BRCA2 which was negative. 1/2013 had colonoscopy without significant findings and repeat scope at age 46 was advised. 1/17/2013. Vaginal bleeding on tamoxifen status post bilateral mastectomy. She had undergone endometrial ablation in 2011 for heavy vaginal bleeding.   She had failure of NovaSure and requested

## 2023-10-02 ENCOUNTER — HOSPITAL ENCOUNTER (OUTPATIENT)
Dept: MRI IMAGING | Age: 48
Discharge: HOME | End: 2023-10-02
Attending: INTERNAL MEDICINE
Payer: COMMERCIAL

## 2023-10-02 DIAGNOSIS — Z85.3 HISTORY OF BREAST CANCER: ICD-10-CM

## 2023-10-02 PROCEDURE — A9579 GAD-BASE MR CONTRAST NOS,1ML: HCPCS | Performed by: INTERNAL MEDICINE

## 2023-10-02 PROCEDURE — C8908 MRI W/O FOL W/CONT, BREAST,: HCPCS

## 2023-10-02 PROCEDURE — 6360000004 HC RX CONTRAST MEDICATION: Performed by: INTERNAL MEDICINE

## 2023-10-02 RX ADMIN — GADOTERIDOL 15 ML: 279.3 INJECTION, SOLUTION INTRAVENOUS at 12:12

## 2023-10-04 ENCOUNTER — TELEPHONE (OUTPATIENT)
Dept: ONCOLOGY | Age: 48
End: 2023-10-04

## 2023-10-04 NOTE — TELEPHONE ENCOUNTER
Patient has called into office stating she wants to cancel appointment to r/v MRI results. Patient states she has seen results and saw that it was negative. She is requesting new appointment for 6 month follow up.

## 2023-12-18 NOTE — ANESTHESIA PRE PROCEDURE
Department of Anesthesiology  Preprocedure Note       Name:  Maldonado Lowry   Age:  50 y.o.  :  1975                                          MRN:  [id number]         Date:  2020      Surgeon: Saeid Jiang):  Balwinder Rosa DPM    Procedure: Procedure(s):  CHEILECTOMY 1ST MPJ LEFT FOOT, INJECTION OF NUCELL    Medications prior to admission:   Prior to Admission medications    Medication Sig Start Date End Date Taking?  Authorizing Provider   Erenumab-aooe (AIMOVIG, 140 MG DOSE, SC) Inject into the skin every 30 days   Yes Historical Provider, MD   ipratropium (ATROVENT) 0.06 % nasal spray SPRAY TWO SPRAYS IN EACH NOSTRIL FOUR TIMES A DAY AS NEEDED FOR RHINITIS 19   JONO San   levothyroxine (SYNTHROID) 112 MCG tablet Take 1 tablet by mouth daily  Patient taking differently: Take 112 mcg by mouth daily PT. STATES IS NO LONGER TAKING CAUSED WEIGHT GAIN PLANS TO TALK TO FAMILY DOCTOR REGARDING THIS ISSUE 19   OJNO San   SUMAtriptan Succinate [facility]) 3 MG/0.5ML SOAJ Inject into the skin as needed    Historical Provider, MD   OnabotulinumtoxinA (BOTOX IJ) Inject as directed as needed    Historical Provider, MD   rizatriptan (MAXALT-MLT) 10 MG disintegrating tablet Take 10 mg by mouth once as needed  17   Historical Provider, MD   topiramate (TOPAMAX) 50 MG tablet Take 100 mg by mouth 2 times daily  17   Historical Provider, MD   DULoxetine (CYMBALTA) 30 MG extended release capsule Take 60 mg by mouth 2 times daily     Historical Provider, MD   ibuprofen (ADVIL;MOTRIN) 800 MG tablet Take 800 mg by mouth every 6 hours as needed for Pain    Historical Provider, MD   traMADol (ULTRAM) 50 MG tablet Take 100 mg by mouth every 6 hours as needed  16   Historical Provider, MD       Current medications:    Current Facility-Administered Medications   Medication Dose Route Frequency Provider Last Rate Last Admin LVM for pt to call back to discuss results- called both numbers on file. Mobile rang, someone picked up but did not answer   Will send [employer] message.     Rashmi Spring, DO  [facility]  12/18/2023 11/2023  CBC grossly normal  CMP grossly normal  ESR 39 elevated  CRP normal  B12 536 normal  Vitamin D 25.1 low  AVISE with RF IgM, IgA and positive; CCP strong positive; B2 G IgM equivocal otherwise grossly negative antibody panel.  Methotrexate levels intermediate (43.5 nmol/L per evaluation recommends more exposure to methotrexate.    0.9 % sodium chloride infusion   Intravenous Continuous Karina Yeung        sodium chloride flush 0.9 % injection 10 mL  10 mL Intravenous 2 times per day Carissa Perez DPM        sodium chloride flush 0.9 % injection 10 mL  10 mL Intravenous PRN Carissa Perez DPM        vancomycin 1000 mg IVPB in 250 mL D5W addavial  1,000 mg Intravenous On Call to [facility], [facility]           Allergies: Allergies   Allergen Reactions    Cefuroxime Axetil Rash       Problem List:    Patient Active Problem List   Diagnosis Code    DUB (dysfunctional uterine bleeding) N93.8    Tired R53.83    Sleep difficulties G47.9    Back pain M54.9    Weight increase R63.5    Anger R45.4    Anxiety F41.9    Depression F32.9    History of breast cancer Z85.3    Vitamin D deficiency E55.9    IgG Gliadin antibody positive R76.8    Homocysteinemia ([facility] [address] E72.11    Anxiety disorder due to general medical condition F06.4    Major depression, recurrent, chronic (HCC) F33.9    Moderate single current episode of major depressive disorder ([facility] [address] F32.1    Opioid use disorder, severe, dependence ([facility] [address] F11.20    Chronic pain syndrome G89.4       Past Medical History:        Diagnosis Date    Anxiety     Cancer [facility]) 2009    right breast cancer, genetics negative, had bilat.  Mx's with reconstruction    Depression     Migraines     Myofascial muscle pain     Neck pain, chronic     Thyroid disease        Past Surgical History:        Procedure Laterality Date    AXILLARY SURGERY      dissection    BREAST RECONSTRUCTION Bilateral     BREAST SURGERY      bilateral masectomies and implants    ENDOMETRIAL ABLATION      HYSTERECTOMY      OTHER SURGICAL HISTORY  10/17/2013    LAPAROSCOPIC ROBOTIC HYSTERECTOMY AND BILATERAL S  AND O    TUNNELED VENOUS PORT PLACEMENT      also removed       Social History:    Social History     Tobacco Use    Smoking status: Former Smoker     Years: 10.00 Quit date: 1999     Years since quittin.7    Smokeless tobacco: Never Used   Substance Use Topics    Alcohol use: No     Alcohol/week: 6.0 standard drinks     Types: 6 Cans of beer per week                                Counseling given: Not Answered      Vital Signs (Current):   Vitals:    12/15/20 1046   Weight: 140 lb (63.5 kg)   Height: 5' 4\" (1.626 m)                                              BP Readings from Last 3 Encounters:   20 115/67   06/15/20 114/62   19 114/80       NPO Status: Time of last liquid consumption: 0600                                                 Date of last liquid consumption: 20                        Date of last solid food consumption: 20    BMI:   Wt Readings from Last 3 Encounters:   12/15/20 140 lb (63.5 kg)   20 150 lb 6.4 oz (68.2 kg)   06/15/20 143 lb 12.8 oz (65.2 kg)     Body mass index is 24.03 kg/m². CBC:   Lab Results   Component Value Date    WBC 7.5 2020    RBC 4.33 2020    HGB 12.9 2020    HCT 39.4 2020    MCV 91 2020    RDW 13.1 2020     2020       CMP:   Lab Results   Component Value Date     2020     2016    K 4.4 2020    K 4.2 2016     2016    CO2 22 06/15/2020    BUN 7 06/15/2020    CREATININE 1.0 2020    CREATININE 0.8 2016    LABGLOM 78 2016    GLUCOSE 115 2016    PROT 6.3 2020    CALCIUM 9.2 2016    BILITOT 0.2 2020    ALKPHOS 53 2020    AST 15 2020    ALT 11 2020       POC Tests: No results for input(s): POCGLU, POCNA, POCK, POCCL, POCBUN, POCHEMO, POCHCT in the last 72 hours.     Coags: No results found for: PROTIME, INR, APTT    HCG (If Applicable):   Lab Results   Component Value Date    PREGTESTUR NEGATIVE 2016        ABGs: No results found for: PHART, PO2ART, MKI5CUL, MDM9DME, BEART, U0NMFVOT     Type & Screen (If Applicable):  Lab Results Component Value Date    LABRH NEG 10/17/2013       Drug/Infectious Status (If Applicable):  No results found for: HIV, HEPCAB    COVID-19 Screening (If Applicable):   Lab Results   Component Value Date    COVID19 Not Detected 12/18/2020         Anesthesia Evaluation  Patient summary reviewed  Airway: Mallampati: II  TM distance: >3 FB   Neck ROM: full  Mouth opening: > = 3 FB Dental: normal exam         Pulmonary:normal exam                              ROS comment: Former smoker   Cardiovascular:                      Neuro/Psych:   (+) headaches: migraine headaches, depression/anxiety             GI/Hepatic/Renal:             Endo/Other:                     Abdominal:           Vascular:                                        Anesthesia Plan      general     ASA 2       Induction: intravenous. MIPS: Postoperative opioids intended and Prophylactic antiemetics administered. Anesthetic plan and risks discussed with patient.       Plan discussed with CRNA and surgical team.                  Linh Coleman MD   12/23/2020

## (undated) DEVICE — IMPREGNATED GAUZE DRESSING: Brand: CUTICERIN 7.5X7.5CM CTN 50

## (undated) DEVICE — PACK PROCEDURE SURG POD SC SRHP LF

## (undated) DEVICE — SOLUTION IV 1000ML 0.9% SOD CHL PH 5 INJ USP VIAFLX PLAS

## (undated) DEVICE — GLOVE ORANGE PI 8   MSG9080

## (undated) DEVICE — GLOVE SURG SZ 8 L11.77IN FNGR THK9.8MIL STRW LTX POLYMER

## (undated) DEVICE — THIN OFFSET (9.0 X 0.38 X 25.0MM)

## (undated) DEVICE — Device